# Patient Record
Sex: MALE | Race: WHITE | Employment: OTHER | URBAN - METROPOLITAN AREA
[De-identification: names, ages, dates, MRNs, and addresses within clinical notes are randomized per-mention and may not be internally consistent; named-entity substitution may affect disease eponyms.]

---

## 2018-06-05 PROBLEM — J85.1 ABSCESS OF UPPER LOBE OF RIGHT LUNG WITH PNEUMONIA (HCC): Status: ACTIVE | Noted: 2018-06-05

## 2018-06-05 PROBLEM — J18.9 PNEUMONIA: Status: ACTIVE | Noted: 2018-06-05

## 2018-06-11 DIAGNOSIS — J85.1 ABSCESS OF UPPER LOBE OF RIGHT LUNG WITH PNEUMONIA (HCC): Primary | ICD-10-CM

## 2018-06-15 ENCOUNTER — HOSPITAL ENCOUNTER (OUTPATIENT)
Dept: CT IMAGING | Age: 28
Discharge: HOME | End: 2018-06-15
Attending: INTERNAL MEDICINE | Admitting: INTERNAL MEDICINE

## 2018-06-15 DIAGNOSIS — J85.1 ABSCESS OF UPPER LOBE OF RIGHT LUNG WITH PNEUMONIA (HCC): ICD-10-CM

## 2018-06-18 LAB
ALBUMIN SERPL-MCNC: NORMAL G/DL
ALP BLD-CCNC: NORMAL U/L
ALT SERPL-CCNC: NORMAL U/L
ANION GAP SERPL CALCULATED.3IONS-SCNC: NORMAL MMOL/L
AST SERPL-CCNC: NORMAL U/L
BASOPHILS ABSOLUTE: 0 /ΜL
BASOPHILS RELATIVE PERCENT: 0.9 %
BILIRUB SERPL-MCNC: NORMAL MG/DL (ref 0.1–1.4)
BUN BLDV-MCNC: 18 MG/DL
CALCIUM SERPL-MCNC: 9.6 MG/DL
CHLORIDE BLD-SCNC: 103 MMOL/L
CO2: 29 MMOL/L
CREAT SERPL-MCNC: 0.77 MG/DL
EOSINOPHILS ABSOLUTE: 0.2 /ΜL
EOSINOPHILS RELATIVE PERCENT: 4.2 %
GFR CALCULATED: NORMAL
GLUCOSE BLD-MCNC: 93 MG/DL
HCT VFR BLD CALC: 43.1 % (ref 41–53)
HEMOGLOBIN: 14.1 G/DL (ref 13.5–17.5)
LYMPHOCYTES ABSOLUTE: 1.3 /ΜL
LYMPHOCYTES RELATIVE PERCENT: 25.2 %
MCH RBC QN AUTO: 29 PG
MCHC RBC AUTO-ENTMCNC: 33 G/DL
MCV RBC AUTO: 88 FL
MONOCYTES ABSOLUTE: 0.6 /ΜL
MONOCYTES RELATIVE PERCENT: 10.8 %
NEUTROPHILS ABSOLUTE: 3 /ΜL
NEUTROPHILS RELATIVE PERCENT: 57 %
PLATELET # BLD: 301 K/ΜL
PMV BLD AUTO: 10.3 FL
POTASSIUM SERPL-SCNC: 4.4 MMOL/L
RBC # BLD: 4.87 10^6/ΜL
SODIUM BLD-SCNC: 141 MMOL/L
TOTAL PROTEIN: NORMAL
WBC # BLD: 5.3 10^3/ML

## 2018-06-20 ENCOUNTER — OFFICE VISIT (OUTPATIENT)
Dept: INFECTIOUS DISEASES | Age: 28
End: 2018-06-20

## 2018-06-20 VITALS
HEIGHT: 74 IN | WEIGHT: 190 LBS | BODY MASS INDEX: 24.38 KG/M2 | DIASTOLIC BLOOD PRESSURE: 61 MMHG | SYSTOLIC BLOOD PRESSURE: 102 MMHG | TEMPERATURE: 98 F | HEART RATE: 75 BPM | OXYGEN SATURATION: 98 %

## 2018-06-20 DIAGNOSIS — J85.1 ABSCESS OF UPPER LOBE OF RIGHT LUNG WITH PNEUMONIA (HCC): Primary | ICD-10-CM

## 2018-06-20 DIAGNOSIS — R59.0 MEDIASTINAL ADENOPATHY: ICD-10-CM

## 2018-06-20 DIAGNOSIS — J18.9 PNEUMONIA DUE TO ORGANISM: ICD-10-CM

## 2018-06-20 DIAGNOSIS — R93.89 ABNORMAL CT OF THE CHEST: ICD-10-CM

## 2018-06-20 PROCEDURE — 99214 OFFICE O/P EST MOD 30 MIN: CPT | Performed by: INTERNAL MEDICINE

## 2018-07-15 NOTE — PROGRESS NOTES
06/18/2018     Lab Results   Component Value Date    CREATININE 0.77 06/18/2018    BUN 18 06/18/2018     06/18/2018    K 4.4 06/18/2018     06/18/2018    CO2 29.0 06/18/2018       Hepatic Function Panel:   Lab Results   Component Value Date    ALKPHOS 117 06/08/2018    ALT 52 06/08/2018    AST 22 06/08/2018    PROT 7.6 06/08/2018    BILITOT <0.2 06/08/2018    BILIDIR <0.2 06/08/2018    IBILI see below 06/08/2018    LABALBU 4.0 06/08/2018     UA:No results found for: Mark Poot, Randall Six, PROTEINU, UROBILINOGEN, NITRU, LEUKOCYTESUR, LABMICR, URINETYPE     No orders to display     FINDINGS:   Mediastinum: The unenhanced heart is unremarkable.  There are no enlarged   thoracic lymph nodes.  Residual thymus tissue is present anterior   mediastinum.  The right PICC terminates in the distal SVC.       Lungs/pleura: The tracheobronchial tree is patent. Marcha Sang is no pneumothorax   or pleural effusion.       There is improving with residual partially cavitating right upper lobe   airspace disease due to resolving pneumonia.  The left lung is clear.       There is no bronchial wall thickening, interstitial lung disease or air   trapping.       Upper Abdomen: Images of the upper abdomen are unremarkable.       Soft Tissues/Bones: The osseous structures are unremarkable.           Impression   1. Improving right upper lobe pneumonia.  Recommend chest radiograph in 6   weeks to confirm resolution.  .       Labs, Microbiology and  Radiology results pertinent to this visit and from care every where  reviewed in detail by me as part of the consultation     IMPRESSION:     Diagnosis Orders   1. Abscess of upper lobe of right lung with pneumonia ([address]     2. Mediastinal adenopathy     3. Abnormal CT of the chest     4.  Pneumonia due to organism       Rt UL PNA cavitary with good clinical response to IV Zosyn and LABS improved microbiological testing was negative suspected acute bacterial Process given the response to IV Zosyn. Follow up CT chest reviewed with pt       PLAN:    1. Finish home course of iv Zosyn   2. CT chest reviewed  3. Labs reviewed  4. Follow up as needed  5. Home care orders given for d/c picc and stop IV abx after completion of home therapy     D/w Pt and Family          Thanks for allowing me to participate in your patient's care and please do not hesitate to  call me with any questions or concerns.     Alonso Carlson MD  Infectious Disease  [facility]) Physician  Phone: [phone]   Fax : [phone]

## 2018-08-08 ENCOUNTER — OFFICE VISIT (OUTPATIENT)
Dept: PULMONOLOGY | Age: 28
End: 2018-08-08

## 2018-08-08 VITALS
WEIGHT: 186 LBS | TEMPERATURE: 97.6 F | RESPIRATION RATE: 16 BRPM | DIASTOLIC BLOOD PRESSURE: 60 MMHG | BODY MASS INDEX: 23.87 KG/M2 | OXYGEN SATURATION: 96 % | HEIGHT: 74 IN | HEART RATE: 90 BPM | SYSTOLIC BLOOD PRESSURE: 100 MMHG

## 2018-08-08 DIAGNOSIS — J18.9 PNEUMONIA OF LEFT LOWER LOBE DUE TO INFECTIOUS ORGANISM: Primary | ICD-10-CM

## 2018-08-08 DIAGNOSIS — R93.89 ABNORMAL CT OF THE CHEST: ICD-10-CM

## 2018-08-08 PROCEDURE — 99215 OFFICE O/P EST HI 40 MIN: CPT | Performed by: INTERNAL MEDICINE

## 2018-08-08 NOTE — PROGRESS NOTES
numbness or tingling, no seizures  SKIN:  No new rashes, no changes in hair or nails  LYMPH:  No masses, no swelling neck, armpits, or groin    PHYSICAL EXAM:  Vitals:    08/08/18 1406   BP: 100/60   Pulse: 90   Resp: 16   Temp: 97.6 °F (36.4 °C)   SpO2: 96%       GENERAL:  Well nourished, alert, appears stated age, no distress  HEENT:  No scleral icterus, no conjunctival irritation  NECK:  No thyromegaly, no bruits  LYMPH:  No cervical or supraclavicular adenopathy  HEART:  Regular rate and rhythm, no murmurs  LUNGS:  Left sided rhonchi with wheezing  ABDOMEN:  No distention, no organomegaly  EXTREMITIES:  No edema, no digital clubbing  NEURO:  No localizing deficits, CN II-XII intact    Pulmonary Function Testing  None    Chest imaging from 7/2018 is reviewed and compared to CT on 6/2018. My interpretation is resolving RUL infiltrate with new LLL patchy, nodular infiltrates. LLL nodule is present and unchanged     ECHO  None  Lab Results   Component Value Date    WBC 8.6 07/30/2018    HGB 14.4 07/30/2018    HCT 42.1 07/30/2018    MCV 85.0 07/30/2018     07/30/2018       No results found for: BNP    Lab Results   Component Value Date    CREATININE 0.8 (L) 07/30/2018    BUN 15 07/30/2018     07/30/2018    K 4.2 07/30/2018    CL 98 (L) 07/30/2018    CO2 26 07/30/2018         Assessment/Plan:  1. Pneumonia of left lower lobe due to infectious organism ([address]  Failed to respond to augmentin and azithromycin, but is at risk for HCAP organisms due to recent admission. We talked about collecting outpatient sputums including AFB but decided to do a bronchoscopy with BAL. I showed both he and his wife the two CT scans and compared and contrasted them. He is better from the first infection but has new findings. Will do BAL with cultures for typical and atypical organisms. Will send fungal and AFB cultures as well. Procedure discussed with them in detail    2.  Abnormal CT of the chest  Findings of

## 2018-08-09 ENCOUNTER — HOSPITAL ENCOUNTER (OUTPATIENT)
Dept: ENDOSCOPY | Age: 28
Discharge: HOME | End: 2018-08-09
Attending: INTERNAL MEDICINE | Admitting: INTERNAL MEDICINE

## 2018-08-09 VITALS
HEART RATE: 70 BPM | BODY MASS INDEX: 23.64 KG/M2 | SYSTOLIC BLOOD PRESSURE: 114 MMHG | TEMPERATURE: 97.4 F | RESPIRATION RATE: 16 BRPM | OXYGEN SATURATION: 98 % | WEIGHT: 184.08 LBS | DIASTOLIC BLOOD PRESSURE: 76 MMHG

## 2018-08-09 PROCEDURE — 99152 MOD SED SAME PHYS/QHP 5/>YRS: CPT | Performed by: INTERNAL MEDICINE

## 2018-08-09 PROCEDURE — 31624 DX BRONCHOSCOPE/LAVAGE: CPT | Performed by: INTERNAL MEDICINE

## 2018-08-09 RX ORDER — SODIUM CHLORIDE 9 MG/ML
INJECTION, SOLUTION INTRAVENOUS CONTINUOUS
Status: DISCONTINUED | OUTPATIENT
Start: 2018-08-09 | End: 2018-08-10 | Stop reason: HOSPADM

## 2018-08-09 RX ORDER — FENTANYL CITRATE 50 UG/ML
INJECTION, SOLUTION INTRAMUSCULAR; INTRAVENOUS
Status: COMPLETED | OUTPATIENT
Start: 2018-08-09 | End: 2018-08-09

## 2018-08-09 RX ORDER — LIDOCAINE HYDROCHLORIDE 20 MG/ML
INJECTION, SOLUTION EPIDURAL; INFILTRATION; INTRACAUDAL; PERINEURAL
Status: COMPLETED | OUTPATIENT
Start: 2018-08-09 | End: 2018-08-09

## 2018-08-09 RX ORDER — PREDNISONE 20 MG/1
20 TABLET ORAL DAILY
Qty: 7 TABLET | Refills: 0 | Status: SHIPPED | OUTPATIENT
Start: 2018-08-09 | End: 2018-08-16

## 2018-08-09 RX ORDER — MIDAZOLAM HYDROCHLORIDE 1 MG/ML
INJECTION INTRAMUSCULAR; INTRAVENOUS
Status: COMPLETED | OUTPATIENT
Start: 2018-08-09 | End: 2018-08-09

## 2018-08-09 RX ORDER — LIDOCAINE HYDROCHLORIDE 40 MG/ML
INJECTION, SOLUTION RETROBULBAR; TOPICAL
Status: COMPLETED | OUTPATIENT
Start: 2018-08-09 | End: 2018-08-09

## 2018-08-09 RX ADMIN — LIDOCAINE HYDROCHLORIDE 4 ML: 40 INJECTION, SOLUTION RETROBULBAR; TOPICAL at 12:35

## 2018-08-09 RX ADMIN — MIDAZOLAM HYDROCHLORIDE 2 MG: 1 INJECTION INTRAMUSCULAR; INTRAVENOUS at 12:38

## 2018-08-09 RX ADMIN — SODIUM CHLORIDE: 9 INJECTION, SOLUTION INTRAVENOUS at 11:57

## 2018-08-09 RX ADMIN — FENTANYL CITRATE 25 MCG: 50 INJECTION, SOLUTION INTRAMUSCULAR; INTRAVENOUS at 12:43

## 2018-08-09 RX ADMIN — MIDAZOLAM HYDROCHLORIDE 1 MG: 1 INJECTION INTRAMUSCULAR; INTRAVENOUS at 12:45

## 2018-08-09 RX ADMIN — LIDOCAINE HYDROCHLORIDE 7 ML: 20 INJECTION, SOLUTION EPIDURAL; INFILTRATION; INTRACAUDAL; PERINEURAL at 12:49

## 2018-08-09 RX ADMIN — FENTANYL CITRATE 25 MCG: 50 INJECTION, SOLUTION INTRAMUSCULAR; INTRAVENOUS at 12:38

## 2018-08-09 ASSESSMENT — PAIN - FUNCTIONAL ASSESSMENT: PAIN_FUNCTIONAL_ASSESSMENT: 0-10

## 2018-08-09 ASSESSMENT — PAIN DESCRIPTION - PAIN TYPE: TYPE: SURGICAL PAIN

## 2018-08-09 ASSESSMENT — PAIN SCALES - GENERAL
PAINLEVEL_OUTOF10: 0
PAINLEVEL_OUTOF10: 0

## 2018-08-09 NOTE — H&P
Pre-procedural H&P      HPI:      Here for elective bronchoscopy due to pneumonia. He was seen yesterday in office    Past Medical History:   Diagnosis Date    Pneumonia     Pneumonia 05/2018       History reviewed. No pertinent surgical history. No Known Allergies    No current outpatient prescriptions on file. Current Facility-Administered Medications   Medication Dose Route Frequency Provider Last Rate Last Dose    0.9 % sodium chloride infusion   Intravenous Continuous Claire Gum, DO 50 mL/hr at 08/09/18 1157         ROS:  GENERAL:  Fevers  HEENT:  No double vision, blurry vision, or difficulty swallowing  HEART:  No chest pain, no palpitations  LUNGS: Cough with discolored sputum, SOB, wheezing  ABDOMEN:  No abdominal pains, no changes in stools  GENITOURINARY:  No increased frequency, no blood in urine  EXTREMITIES:  No swelling, no lesions  NEURO:  No numbness or tingling, no seizures  SKIN:  No new rashes, no changes in hair or nails  LYMPH:  No masses, no swelling neck, armpits, or groin    GENERAL:  Well nourished, alert, appears stated age, no distress  HEENT:  No scleral icterus, no conjunctival irritation  NECK:  No thyromegaly, no bruits  LYMPH:  No cervical or supraclavicular adenopathy  HEART:  Regular rate and rhythm, no murmurs  LUNGS:  Crackles  ABDOMEN:  No distention, no organomegaly  EXTREMITIES:  No edema, no digital clubbing  NEURO:  No localizing deficits, CN II-XII intact    Assessment/Plan:  1. Pneumonia, LLL. Possible atypical organism.   ASA 2   -BAL, airway exam    Jessenia Sanchez DO  [facility]  [phone]

## 2018-08-11 LAB
CULTURE, RESPIRATORY: NORMAL
GRAM STAIN RESULT: NORMAL

## 2018-08-13 LAB
APPEARANCE BAL (LAVAGE): ABNORMAL
CLOT EVALUATION BAL: ABNORMAL
COLOR LAVAGE: ABNORMAL
EOSIN: 1 %
LYMPHOCYTES, BAL: 6 % (ref 5–10)
MACROPHAGES, BAL: 5 % (ref 90–95)
MONONUCLEAR UNIDENTIFIED CELLS FLUID BAL: 1 %
NUMBER OF CELLS COUNTED BAL (LAVAGE): 200
PATH REVIEW BAL (LAVAGE): NO
RBC, BAL: ABNORMAL /CUMM
SEGMENTED NEUTROPHILS, BAL: 87 % (ref 5–10)
WBC/EPI CELLS BAL: 1166 /CUMM

## 2018-08-17 DIAGNOSIS — J85.1 ABSCESS OF UPPER LOBE OF RIGHT LUNG WITH PNEUMONIA (HCC): Primary | ICD-10-CM

## 2018-08-17 RX ORDER — VORICONAZOLE 200 MG/1
200 TABLET, FILM COATED ORAL 2 TIMES DAILY
Qty: 60 TABLET | Refills: 3 | Status: SHIPPED | OUTPATIENT
Start: 2018-08-17 | End: 2018-09-16

## 2018-08-22 ENCOUNTER — OFFICE VISIT (OUTPATIENT)
Dept: INFECTIOUS DISEASES | Age: 28
End: 2018-08-22

## 2018-08-22 VITALS
HEIGHT: 74 IN | OXYGEN SATURATION: 98 % | DIASTOLIC BLOOD PRESSURE: 62 MMHG | HEART RATE: 77 BPM | SYSTOLIC BLOOD PRESSURE: 110 MMHG | WEIGHT: 184 LBS | BODY MASS INDEX: 23.61 KG/M2 | TEMPERATURE: 98.5 F

## 2018-08-22 DIAGNOSIS — J18.9 PNEUMONIA DUE TO ORGANISM: ICD-10-CM

## 2018-08-22 DIAGNOSIS — R93.89 ABNORMAL CT OF THE CHEST: ICD-10-CM

## 2018-08-22 DIAGNOSIS — B44.9 ASPERGILLUS PNEUMONIA (HCC): Primary | ICD-10-CM

## 2018-08-22 LAB
A/G RATIO: 1.7 (ref 1.1–2.2)
ALBUMIN SERPL-MCNC: 4.7 G/DL (ref 3.4–5)
ALP BLD-CCNC: 83 U/L (ref 40–129)
ALT SERPL-CCNC: 41 U/L (ref 10–40)
ANION GAP SERPL CALCULATED.3IONS-SCNC: 12 MMOL/L (ref 3–16)
AST SERPL-CCNC: 27 U/L (ref 15–37)
BILIRUB SERPL-MCNC: 0.5 MG/DL (ref 0–1)
BILIRUBIN DIRECT: <0.2 MG/DL (ref 0–0.3)
BILIRUBIN, INDIRECT: ABNORMAL MG/DL (ref 0–1)
BUN BLDV-MCNC: 21 MG/DL (ref 7–20)
CALCIUM SERPL-MCNC: 9.5 MG/DL (ref 8.3–10.6)
CHLORIDE BLD-SCNC: 103 MMOL/L (ref 99–110)
CO2: 25 MMOL/L (ref 21–32)
CREAT SERPL-MCNC: 0.7 MG/DL (ref 0.9–1.3)
GFR AFRICAN AMERICAN: >60
GFR NON-AFRICAN AMERICAN: >60
GLOBULIN: 2.8 G/DL
GLUCOSE BLD-MCNC: 93 MG/DL (ref 70–99)
IGA: 293 MG/DL (ref 70–400)
IGG: 1111 MG/DL (ref 700–1600)
IGM: 86 MG/DL (ref 40–230)
POTASSIUM SERPL-SCNC: 4.1 MMOL/L (ref 3.5–5.1)
SODIUM BLD-SCNC: 140 MMOL/L (ref 136–145)
TOTAL PROTEIN: 7.5 G/DL (ref 6.4–8.2)

## 2018-08-22 PROCEDURE — 99215 OFFICE O/P EST HI 40 MIN: CPT | Performed by: INTERNAL MEDICINE

## 2018-08-24 LAB — MISCELLANEOUS LAB TEST ORDER: NORMAL

## 2018-08-27 LAB — ASPERGILLUS ANTIBODY ID: NORMAL

## 2018-09-10 LAB
FUNGUS (MYCOLOGY) CULTURE: ABNORMAL
FUNGUS (MYCOLOGY) CULTURE: ABNORMAL
FUNGUS STAIN: ABNORMAL
ORGANISM: ABNORMAL

## 2018-09-10 NOTE — PROGRESS NOTES
this study. 4. Fatty liver. 5. Mild diverticulosis of the visualized colon. 6. Stable left costophrenic angle pulmonary nodule measuring 5 mm.  Follow-up   recommended according to Fleischner society guidelines provided below.       RECOMMENDATIONS:   Fleischner Society guidelines for follow-up and management of incidentally   detected pulmonary nodules:         Labs, Microbiology and  Radiology results pertinent to this visit and from care every where  reviewed in detail by me as part of the consultation     IMPRESSION:     Diagnosis Orders   1. Aspergillus pneumonia ([address]     2. Pneumonia due to organism  ASPERGILLUS GALACTOMANNAN AG ASSAY    MISCELLANEOUS SENDOUT 1    HEPATIC FUNCTION PANEL    IGG, IGA, IGM    Flow Cytometry T[place] CD4/CD8 Blood    ASPERGILLUS ANTIBODY BY IMMUNODIFFUSION    COMPREHENSIVE METABOLIC PANEL   3. 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 Check Aspergillus antibody  2. CD4 counts  3. HIV screen recently was negative   4. Immune globulin level  5. Aspergillus antigen , 1-3 Beta d GLUCAN   6. Start Voriconazole x oral (V fend) x 200 mg Q 12 HRS  7. Side effects d/w pt and his wife   8. Follow up x 8 weeks   9.d/w          Thanks for allowing me to participate in your patient's care and please do not hesitate to  call me with any questions or concerns.     Chuck Anthony MD  Infectious Disease  [facility]) Physician  Phone: [phone]   Fax : [phone]

## 2018-09-25 LAB
AFB CULTURE (MYCOBACTERIA): NORMAL
AFB SMEAR: NORMAL

## 2018-09-26 ENCOUNTER — HOSPITAL ENCOUNTER (OUTPATIENT)
Age: 28
Discharge: HOME | End: 2018-09-26
Payer: COMMERCIAL

## 2018-09-26 LAB
A/G RATIO: 1.5 (ref 1.1–2.2)
ALBUMIN SERPL-MCNC: 4.8 G/DL (ref 3.4–5)
ALP BLD-CCNC: 96 U/L (ref 40–129)
ALT SERPL-CCNC: 37 U/L (ref 10–40)
ANION GAP SERPL CALCULATED.3IONS-SCNC: 13 MMOL/L (ref 3–16)
AST SERPL-CCNC: 30 U/L (ref 15–37)
BILIRUB SERPL-MCNC: 0.3 MG/DL (ref 0–1)
BUN BLDV-MCNC: 26 MG/DL (ref 7–20)
CALCIUM SERPL-MCNC: 9.7 MG/DL (ref 8.3–10.6)
CHLORIDE BLD-SCNC: 104 MMOL/L (ref 99–110)
CO2: 24 MMOL/L (ref 21–32)
CREAT SERPL-MCNC: 0.8 MG/DL (ref 0.9–1.3)
GFR AFRICAN AMERICAN: >60
GFR NON-AFRICAN AMERICAN: >60
GLOBULIN: 3.1 G/DL
GLUCOSE BLD-MCNC: 88 MG/DL (ref 70–99)
POTASSIUM SERPL-SCNC: 3.8 MMOL/L (ref 3.5–5.1)
SODIUM BLD-SCNC: 141 MMOL/L (ref 136–145)
TOTAL PROTEIN: 7.9 G/DL (ref 6.4–8.2)

## 2018-09-26 PROCEDURE — 36415 COLL VENOUS BLD VENIPUNCTURE: CPT

## 2018-09-26 PROCEDURE — 80053 COMPREHEN METABOLIC PANEL: CPT

## 2018-10-03 ENCOUNTER — OFFICE VISIT (OUTPATIENT)
Dept: INFECTIOUS DISEASES | Age: 28
End: 2018-10-03
Payer: COMMERCIAL

## 2018-10-03 VITALS
TEMPERATURE: 97.9 F | OXYGEN SATURATION: 98 % | HEIGHT: 74 IN | HEART RATE: 72 BPM | WEIGHT: 184 LBS | SYSTOLIC BLOOD PRESSURE: 102 MMHG | DIASTOLIC BLOOD PRESSURE: 60 MMHG | BODY MASS INDEX: 23.61 KG/M2

## 2018-10-03 DIAGNOSIS — B44.9 ASPERGILLUS PNEUMONIA (HCC): ICD-10-CM

## 2018-10-03 DIAGNOSIS — R93.89 ABNORMAL CT OF THE CHEST: ICD-10-CM

## 2018-10-03 DIAGNOSIS — J18.9 PNEUMONIA DUE TO ORGANISM: Primary | ICD-10-CM

## 2018-10-03 PROCEDURE — 99213 OFFICE O/P EST LOW 20 MIN: CPT | Performed by: INTERNAL MEDICINE

## 2018-10-03 RX ORDER — VORICONAZOLE 200 MG/1
200 TABLET, FILM COATED ORAL 2 TIMES DAILY
COMMUNITY
End: 2019-04-22 | Stop reason: ALTCHOICE

## 2018-10-21 NOTE — PROGRESS NOTES
function. Denies joint swelling or pain. No myalgia, arthralgia. Denies focal weakness, sensory change or other neurologic symptoms  No lymph node swelling or tenderness. No symptoms endocrinopathy. No symptoms hematologic disease.     PHYSICAL EXAM:    Vitals:    /60 (Site: Left Upper Arm, Position: Sitting, Cuff Size: Large Adult)   Pulse 72   Temp 97.9 °F (36.6 °C) (Oral)   Ht 6' 2\" (1.88 m)   Wt 184 lb (83.5 kg)   SpO2 98%   BMI 23.62 kg/m²   General Appearance: alert,  in no acute distress, no pallor, no icterus   Skin: warm and dry, no rash or erythema  Head: normocephalic and atraumatic  Eyes: pupils equal, round, and reactive to light, conjunctivae normal  ENT: tympanic membrane, external ear and ear canal normal bilaterally, nose without deformity, nasal mucosa and turbinates normal without polyps  Neck: supple and non-tender without mass, no thyromegaly or thyroid nodules, no cervical lymphadenopathy  Pulmonary/Chest: clear to auscultation bilaterally- no wheezes, rales or rhonchi, normal air movement,  Cardiovascular: normal rate, regular rhythm, normal S1 and S2, no murmurs, rubs, clicks, or gallops,   Abdomen: soft, non-tender, non-distended, normal bowel sounds, no masses or organomegaly  Extremities: no cyanosis, clubbing or edema  Musculoskeletal: normal range of motion, no joint swelling, deformity or tenderness  Neurologic: reflexes normal and symmetric, no cranial nerve deficit,   Psych:  Orientation, sensorium, mood normal           DATA:    See EPIC  Lab Results   Component Value Date    WBC 8.6 07/30/2018    HGB 14.4 07/30/2018    HCT 42.1 07/30/2018    MCV 85.0 07/30/2018     07/30/2018     Lab Results   Component Value Date    CREATININE 0.8 (L) 09/26/2018    BUN 26 (H) 09/26/2018     09/26/2018    K 3.8 09/26/2018     09/26/2018    CO2 24 09/26/2018       Hepatic Function Panel:   Lab Results   Component Value Date    ALKPHOS 96 09/26/2018    ALT 37

## 2018-11-09 DIAGNOSIS — R93.89 ABNORMAL CT OF THE CHEST: ICD-10-CM

## 2018-11-09 DIAGNOSIS — J18.9 PNEUMONIA DUE TO ORGANISM: ICD-10-CM

## 2018-11-09 LAB
A/G RATIO: 2.1 (ref 1.1–2.2)
ALBUMIN SERPL-MCNC: 5 G/DL (ref 3.4–5)
ALP BLD-CCNC: 91 U/L (ref 40–129)
ALT SERPL-CCNC: 35 U/L (ref 10–40)
ANION GAP SERPL CALCULATED.3IONS-SCNC: 12 MMOL/L (ref 3–16)
AST SERPL-CCNC: 27 U/L (ref 15–37)
BILIRUB SERPL-MCNC: <0.2 MG/DL (ref 0–1)
BUN BLDV-MCNC: 19 MG/DL (ref 7–20)
CALCIUM SERPL-MCNC: 9.4 MG/DL (ref 8.3–10.6)
CHLORIDE BLD-SCNC: 104 MMOL/L (ref 99–110)
CO2: 25 MMOL/L (ref 21–32)
CREAT SERPL-MCNC: 0.8 MG/DL (ref 0.9–1.3)
GFR AFRICAN AMERICAN: >60
GFR NON-AFRICAN AMERICAN: >60
GLOBULIN: 2.4 G/DL
GLUCOSE BLD-MCNC: 100 MG/DL (ref 70–99)
POTASSIUM SERPL-SCNC: 4 MMOL/L (ref 3.5–5.1)
SODIUM BLD-SCNC: 141 MMOL/L (ref 136–145)
TOTAL PROTEIN: 7.4 G/DL (ref 6.4–8.2)

## 2018-11-16 ENCOUNTER — APPOINTMENT (OUTPATIENT)
Dept: CT IMAGING | Age: 28
End: 2018-11-16
Payer: COMMERCIAL

## 2018-11-16 ENCOUNTER — APPOINTMENT (OUTPATIENT)
Dept: GENERAL RADIOLOGY | Age: 28
End: 2018-11-16
Payer: COMMERCIAL

## 2018-11-16 ENCOUNTER — HOSPITAL ENCOUNTER (EMERGENCY)
Age: 28
Discharge: HOME | End: 2018-11-16
Attending: EMERGENCY MEDICINE
Payer: COMMERCIAL

## 2018-11-16 VITALS
HEIGHT: 74 IN | RESPIRATION RATE: 16 BRPM | BODY MASS INDEX: 23.48 KG/M2 | TEMPERATURE: 98 F | DIASTOLIC BLOOD PRESSURE: 69 MMHG | HEART RATE: 66 BPM | OXYGEN SATURATION: 98 % | WEIGHT: 182.98 LBS | SYSTOLIC BLOOD PRESSURE: 112 MMHG

## 2018-11-16 DIAGNOSIS — R06.02 SHORTNESS OF BREATH: ICD-10-CM

## 2018-11-16 DIAGNOSIS — R06.00 DYSPNEA, UNSPECIFIED TYPE: Primary | ICD-10-CM

## 2018-11-16 DIAGNOSIS — K40.90 LEFT INGUINAL HERNIA: ICD-10-CM

## 2018-11-16 DIAGNOSIS — B44.9 ASPERGILLUS PNEUMONIA (HCC): ICD-10-CM

## 2018-11-16 LAB
A/G RATIO: 1.6 (ref 1.1–2.2)
ALBUMIN SERPL-MCNC: 4.9 G/DL (ref 3.4–5)
ALP BLD-CCNC: 90 U/L (ref 40–129)
ALT SERPL-CCNC: 30 U/L (ref 10–40)
ANION GAP SERPL CALCULATED.3IONS-SCNC: 11 MMOL/L (ref 3–16)
AST SERPL-CCNC: 24 U/L (ref 15–37)
BASE EXCESS VENOUS: 2 MMOL/L (ref -3–3)
BASOPHILS ABSOLUTE: 0.1 K/UL (ref 0–0.2)
BASOPHILS RELATIVE PERCENT: 0.7 %
BILIRUB SERPL-MCNC: 0.3 MG/DL (ref 0–1)
BUN BLDV-MCNC: 19 MG/DL (ref 7–20)
CALCIUM SERPL-MCNC: 9.3 MG/DL (ref 8.3–10.6)
CHLORIDE BLD-SCNC: 101 MMOL/L (ref 99–110)
CO2: 26 MMOL/L (ref 21–32)
CREAT SERPL-MCNC: 0.7 MG/DL (ref 0.9–1.3)
EOSINOPHILS ABSOLUTE: 0.2 K/UL (ref 0–0.6)
EOSINOPHILS RELATIVE PERCENT: 2.6 %
GFR AFRICAN AMERICAN: >60
GFR NON-AFRICAN AMERICAN: >60
GLOBULIN: 3.1 G/DL
GLUCOSE BLD-MCNC: 100 MG/DL (ref 70–99)
HCO3 VENOUS: 27 MMOL/L (ref 23–29)
HCT VFR BLD CALC: 43.9 % (ref 40.5–52.5)
HEMOGLOBIN: 14.3 G/DL (ref 13.5–17.5)
LYMPHOCYTES ABSOLUTE: 2 K/UL (ref 1–5.1)
LYMPHOCYTES RELATIVE PERCENT: 27.1 %
MCH RBC QN AUTO: 28.1 PG (ref 26–34)
MCHC RBC AUTO-ENTMCNC: 32.6 G/DL (ref 31–36)
MCV RBC AUTO: 86.2 FL (ref 80–100)
MONOCYTES ABSOLUTE: 0.6 K/UL (ref 0–1.3)
MONOCYTES RELATIVE PERCENT: 9 %
NEUTROPHILS ABSOLUTE: 4.3 K/UL (ref 1.7–7.7)
NEUTROPHILS RELATIVE PERCENT: 60.6 %
O2 SAT, VEN: 77 %
O2 THERAPY: ABNORMAL
PCO2, VEN: 42.1 MMHG (ref 40–50)
PDW BLD-RTO: 11.9 % (ref 12.4–15.4)
PH VENOUS: 7.41 (ref 7.35–7.45)
PLATELET # BLD: 301 K/UL (ref 135–450)
PMV BLD AUTO: 7.9 FL (ref 5–10.5)
PO2, VEN: 41 MMHG (ref 25–40)
POTASSIUM REFLEX MAGNESIUM: 3.8 MMOL/L (ref 3.5–5.1)
RBC # BLD: 5.1 M/UL (ref 4.2–5.9)
SODIUM BLD-SCNC: 138 MMOL/L (ref 136–145)
TCO2 CALC VENOUS: 28 MMOL/L
TOTAL PROTEIN: 8 G/DL (ref 6.4–8.2)
WBC # BLD: 7.2 K/UL (ref 4–11)

## 2018-11-16 PROCEDURE — 85025 COMPLETE CBC W/AUTO DIFF WBC: CPT

## 2018-11-16 PROCEDURE — 99285 EMERGENCY DEPT VISIT HI MDM: CPT

## 2018-11-16 PROCEDURE — 71046 X-RAY EXAM CHEST 2 VIEWS: CPT

## 2018-11-16 PROCEDURE — 36415 COLL VENOUS BLD VENIPUNCTURE: CPT

## 2018-11-16 PROCEDURE — 82803 BLOOD GASES ANY COMBINATION: CPT

## 2018-11-16 PROCEDURE — 71260 CT THORAX DX C+: CPT

## 2018-11-16 PROCEDURE — 80053 COMPREHEN METABOLIC PANEL: CPT

## 2018-11-16 PROCEDURE — 6360000004 HC RX CONTRAST MEDICATION: Performed by: EMERGENCY MEDICINE

## 2018-11-16 RX ADMIN — IOPAMIDOL 100 ML: 755 INJECTION, SOLUTION INTRAVENOUS at 19:27

## 2018-11-16 ASSESSMENT — ENCOUNTER SYMPTOMS
SHORTNESS OF BREATH: 1
DIARRHEA: 0
SORE THROAT: 0
ABDOMINAL PAIN: 0
CHEST TIGHTNESS: 0
VOMITING: 0
NAUSEA: 0

## 2018-11-16 ASSESSMENT — PAIN SCALES - GENERAL
PAINLEVEL_OUTOF10: 0
PAINLEVEL_OUTOF10: 0

## 2018-11-16 NOTE — ED PROVIDER NOTES
[facility]  eMERGENCY dEPARTMENT eNCOUnter        Pt Name: Horacio Cisneros  MRN: [id number]  [place] 1990  Date of evaluation: 11/16/2018  Provider: Ilana Blair MD  PCP: Mayito Hernández       Chief Complaint   Patient presents with    Shortness of Breath     shortness of breath onset 2 days ago, having lung issues since May       HISTORY OFPRESENT ILLNESS   (Location/Symptom, Timing/Onset, Context/Setting, Quality, Duration, Modifying Factors,Severity)  Note limiting factors. Horacio Cisneros is a 33 y.o. male  with history of Aspergillus pneumonia that was diagnosed 2 months ago presents with complaint of worsening shortness of breath. Patient states that he was started on a voriconazole and is being followed by infectious disease, Dr. Carolyn Bradshaw. Patient states his symptoms began a few days ago which are worsening shortness of breath with exertion and some shortness of breath at rest.  Denies any cough or sputum production. Denies any chest pain. Denies any fevers states he does have night sweats. Patient states he has been taking the voriconazole as prescribed. Patient also complains of some left-sided testicle pain which has been present for the past several years intermittently. The pain is an aching sensation gets worse with standing for long period time. Denies any trauma to the testicles. Denies any penile discharge or pain. Denies any difficulty urinating. Denies any open sores or lesions to the genitals. Nursing Notes were all reviewed and agreed with or any disagreements were addressed  in the HPI. REVIEW OF SYSTEMS    (2-9 systems for level 4, 10 or more for level 5)     Review of Systems   Constitutional: Positive for diaphoresis (night sweats). Negative for chills and fever. HENT: Negative for congestion and sore throat. Respiratory: Positive for shortness of breath. Negative for chest tightness.     Cardiovascular: Negative for chest pain. Gastrointestinal: Negative for abdominal pain, diarrhea, nausea and vomiting. Genitourinary: Positive for testicular pain. Negative for decreased urine volume, discharge, dysuria, frequency, penile pain and urgency. Musculoskeletal: Negative for arthralgias and neck pain. Skin: Negative for rash and wound. Neurological: Negative for weakness and numbness. Positives and Pertinent negatives as per HPI. Except as noted above in the ROS, all other systems were reviewed andnegative. PASTMEDICAL HISTORY     Past Medical History:   Diagnosis Date    Pneumonia     Aspergillius fungus    Pneumonia 05/2018         SURGICAL HISTORY     History reviewed. No pertinent surgical history. CURRENT MEDICATIONS       Previous Medications    VORICONAZOLE (VFEND) 200 MG TABLET    Take 200 mg by mouth 2 times daily       ALLERGIES     Patient has no known allergies. FAMILY HISTORY     History reviewed. No pertinent family history. SOCIAL HISTORY       Social History     Social History    Marital status:      Spouse name: N/A    Number of children: N/A    Years of education: N/A     Social History Main Topics    Smoking status: Never Smoker    Smokeless tobacco: Never Used    Alcohol use Yes      Comment: rarely    Drug use: No    Sexual activity: Yes     Partners: Female     Other Topics Concern    None     Social History Narrative    None       SCREENINGS             PHYSICAL EXAM    (up to 7 for level 4, 8 or more for level 5)     ED Triage Vitals [11/16/18 1802]   BP Temp Temp Source Pulse Resp SpO2 Height Weight   129/76 98 °F (36.7 °C) Oral 79 16 98 % 6' 2\" (1.88 m) 182 lb 15.7 oz (83 kg)       Physical Exam   Constitutional: He is oriented to person, place, and time. He appears well-developed and well-nourished. No distress. HENT:   Head: Normocephalic and atraumatic.    Mouth/Throat: Oropharynx is clear and moist.   No stridor   Eyes: Pupils are equal, round, and reactive to light. Conjunctivae and EOM are normal.   Neck: Normal range of motion. Neck supple. Cardiovascular: Normal rate, regular rhythm, normal heart sounds and intact distal pulses. Pulmonary/Chest: Effort normal and breath sounds normal. No respiratory distress. He has no wheezes. He has no rales. Speaking in full sentences, no respiratory distress   Abdominal: Soft. Bowel sounds are normal. He exhibits no distension. There is no tenderness. There is no rebound. A hernia is present. Hernia confirmed positive in the left inguinal area. Hernia confirmed negative in the right inguinal area. Genitourinary: Penis normal. Right testis shows no swelling and no tenderness. Left testis shows no swelling and no tenderness. Circumcised. No penile tenderness. Musculoskeletal: Normal range of motion. He exhibits no tenderness. Lymphadenopathy: No inguinal adenopathy noted on the right or left side. Neurological: He is alert and oriented to person, place, and time. Skin: Skin is warm and dry. Capillary refill takes less than 2 seconds. He is not diaphoretic. No erythema. Nursing note and vitals reviewed.           DIAGNOSTIC RESULTS   LABS:    Labs Reviewed   CBC WITH AUTO DIFFERENTIAL - Abnormal; Notable for the following:        Result Value    RDW 11.9 (*)     All other components within normal limits    Narrative:     Performed at:  [facility]  [address]   Phone [phone]   COMPREHENSIVE METABOLIC PANEL W/ REFLEX TO MG FOR LOW K - Abnormal; Notable for the following:     Glucose 100 (*)     CREATININE 0.7 (*)     All other components within normal limits    Narrative:     Performed at:  [facility]  [address]   Phone [phone]   BLOOD GAS, VENOUS - Abnormal; Notable for the following:     pO2, Maximo 41.0 (*)     All other components

## 2018-12-17 DIAGNOSIS — J18.9 PNEUMONIA DUE TO ORGANISM: ICD-10-CM

## 2018-12-17 DIAGNOSIS — R93.89 ABNORMAL CT OF THE CHEST: ICD-10-CM

## 2018-12-18 LAB
A/G RATIO: 1.8 (ref 1.1–2.2)
ALBUMIN SERPL-MCNC: 5 G/DL (ref 3.4–5)
ALP BLD-CCNC: 85 U/L (ref 40–129)
ALT SERPL-CCNC: 31 U/L (ref 10–40)
ANION GAP SERPL CALCULATED.3IONS-SCNC: 17 MMOL/L (ref 3–16)
AST SERPL-CCNC: 28 U/L (ref 15–37)
BILIRUB SERPL-MCNC: 0.3 MG/DL (ref 0–1)
BUN BLDV-MCNC: 18 MG/DL (ref 7–20)
CALCIUM SERPL-MCNC: 9.6 MG/DL (ref 8.3–10.6)
CHLORIDE BLD-SCNC: 102 MMOL/L (ref 99–110)
CO2: 25 MMOL/L (ref 21–32)
CREAT SERPL-MCNC: 0.9 MG/DL (ref 0.9–1.3)
GFR AFRICAN AMERICAN: >60
GFR NON-AFRICAN AMERICAN: >60
GLOBULIN: 2.8 G/DL
GLUCOSE BLD-MCNC: 82 MG/DL (ref 70–99)
POTASSIUM SERPL-SCNC: 4.2 MMOL/L (ref 3.5–5.1)
SODIUM BLD-SCNC: 144 MMOL/L (ref 136–145)
TOTAL PROTEIN: 7.8 G/DL (ref 6.4–8.2)

## 2018-12-19 ENCOUNTER — OFFICE VISIT (OUTPATIENT)
Dept: INFECTIOUS DISEASES | Age: 28
End: 2018-12-19
Payer: COMMERCIAL

## 2018-12-19 VITALS
HEART RATE: 82 BPM | OXYGEN SATURATION: 98 % | DIASTOLIC BLOOD PRESSURE: 60 MMHG | SYSTOLIC BLOOD PRESSURE: 102 MMHG | BODY MASS INDEX: 23.61 KG/M2 | HEIGHT: 74 IN | TEMPERATURE: 97.8 F | WEIGHT: 184 LBS

## 2018-12-19 DIAGNOSIS — R93.89 ABNORMAL CT OF THE CHEST: ICD-10-CM

## 2018-12-19 DIAGNOSIS — B44.9 ASPERGILLUS PNEUMONIA (HCC): Primary | ICD-10-CM

## 2018-12-19 DIAGNOSIS — J18.9 PNEUMONIA DUE TO ORGANISM: ICD-10-CM

## 2018-12-19 PROCEDURE — 99213 OFFICE O/P EST LOW 20 MIN: CPT | Performed by: INTERNAL MEDICINE

## 2018-12-19 RX ORDER — TAMSULOSIN HYDROCHLORIDE 0.4 MG/1
0.4 CAPSULE ORAL DAILY
COMMUNITY
End: 2020-03-11

## 2018-12-19 NOTE — PROGRESS NOTES
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 He recently had CT chest done on Nov 2018 for chest pains and tree in bud changes at Left base have improved and no new lesions. Past Medical History:    Past Medical History:   Diagnosis Date    Pneumonia     Aspergillius fungus    Pneumonia 05/2018       Past Surgical History:    No past surgical history on file. Current Medications:    Current Outpatient Prescriptions   Medication Sig Dispense Refill    tamsulosin (FLOMAX) 0.4 MG capsule Take 0.4 mg by mouth daily      voriconazole (VFEND) 200 MG tablet Take 200 mg by mouth 2 times daily       No current facility-administered medications for this visit. Allergies:  Patient has no known allergies. Immunizations :   Immunization History   Administered Date(s) Administered    PPD Test 06/05/2018           Social History:     Social History     Social History    Marital status:      Spouse name: N/A    Number of children: N/A    Years of education: N/A     Social History Main Topics    Smoking status: Never Smoker    Smokeless tobacco: Never Used    Alcohol use Yes      Comment: rarely    Drug use: No    Sexual activity: Yes     Partners: Female     Other Topics Concern    None     Social History Narrative    None       Family History : no DM no DVT       REVIEW OF SYSTEMS:    No fevers, chills, sweats. No weight loss. No visual change, eye pain, eye discharge.     No oral

## 2019-04-22 ENCOUNTER — APPOINTMENT (OUTPATIENT)
Dept: GENERAL RADIOLOGY | Age: 29
End: 2019-04-22
Payer: COMMERCIAL

## 2019-04-22 ENCOUNTER — APPOINTMENT (OUTPATIENT)
Dept: CT IMAGING | Age: 29
End: 2019-04-22
Payer: COMMERCIAL

## 2019-04-22 ENCOUNTER — HOSPITAL ENCOUNTER (EMERGENCY)
Age: 29
Discharge: HOME | End: 2019-04-22
Payer: COMMERCIAL

## 2019-04-22 VITALS
BODY MASS INDEX: 23.62 KG/M2 | HEART RATE: 68 BPM | DIASTOLIC BLOOD PRESSURE: 67 MMHG | RESPIRATION RATE: 14 BRPM | SYSTOLIC BLOOD PRESSURE: 120 MMHG | OXYGEN SATURATION: 100 % | HEIGHT: 74 IN | TEMPERATURE: 97.2 F

## 2019-04-22 DIAGNOSIS — R04.2 COUGHING BLOOD: ICD-10-CM

## 2019-04-22 DIAGNOSIS — J06.9 URI WITH COUGH AND CONGESTION: Primary | ICD-10-CM

## 2019-04-22 LAB
A/G RATIO: 1.1 (ref 1.1–2.2)
ALBUMIN SERPL-MCNC: 4.2 G/DL (ref 3.4–5)
ALP BLD-CCNC: 87 U/L (ref 40–129)
ALT SERPL-CCNC: 23 U/L (ref 10–40)
ANION GAP SERPL CALCULATED.3IONS-SCNC: 11 MMOL/L (ref 3–16)
AST SERPL-CCNC: 19 U/L (ref 15–37)
BASOPHILS ABSOLUTE: 0.1 K/UL (ref 0–0.2)
BASOPHILS RELATIVE PERCENT: 0.6 %
BILIRUB SERPL-MCNC: 0.5 MG/DL (ref 0–1)
BUN BLDV-MCNC: 20 MG/DL (ref 7–20)
CALCIUM SERPL-MCNC: 9.3 MG/DL (ref 8.3–10.6)
CHLORIDE BLD-SCNC: 104 MMOL/L (ref 99–110)
CO2: 26 MMOL/L (ref 21–32)
CREAT SERPL-MCNC: 0.7 MG/DL (ref 0.9–1.3)
EOSINOPHILS ABSOLUTE: 0.2 K/UL (ref 0–0.6)
EOSINOPHILS RELATIVE PERCENT: 1.9 %
GFR AFRICAN AMERICAN: >60
GFR NON-AFRICAN AMERICAN: >60
GLOBULIN: 3.8 G/DL
GLUCOSE BLD-MCNC: 96 MG/DL (ref 70–99)
HCT VFR BLD CALC: 40.9 % (ref 40.5–52.5)
HEMOGLOBIN: 14.1 G/DL (ref 13.5–17.5)
LACTIC ACID: 0.5 MMOL/L (ref 0.4–2)
LYMPHOCYTES ABSOLUTE: 2.1 K/UL (ref 1–5.1)
LYMPHOCYTES RELATIVE PERCENT: 24.6 %
MCH RBC QN AUTO: 29.6 PG (ref 26–34)
MCHC RBC AUTO-ENTMCNC: 34.5 G/DL (ref 31–36)
MCV RBC AUTO: 85.8 FL (ref 80–100)
MONOCYTES ABSOLUTE: 0.9 K/UL (ref 0–1.3)
MONOCYTES RELATIVE PERCENT: 10.2 %
NEUTROPHILS ABSOLUTE: 5.4 K/UL (ref 1.7–7.7)
NEUTROPHILS RELATIVE PERCENT: 62.7 %
PDW BLD-RTO: 13 % (ref 12.4–15.4)
PLATELET # BLD: 251 K/UL (ref 135–450)
PMV BLD AUTO: 8 FL (ref 5–10.5)
POTASSIUM SERPL-SCNC: 4 MMOL/L (ref 3.5–5.1)
RBC # BLD: 4.76 M/UL (ref 4.2–5.9)
SODIUM BLD-SCNC: 141 MMOL/L (ref 136–145)
TOTAL PROTEIN: 8 G/DL (ref 6.4–8.2)
TROPONIN: <0.01 NG/ML
WBC # BLD: 8.6 K/UL (ref 4–11)

## 2019-04-22 PROCEDURE — 84484 ASSAY OF TROPONIN QUANT: CPT

## 2019-04-22 PROCEDURE — 71046 X-RAY EXAM CHEST 2 VIEWS: CPT

## 2019-04-22 PROCEDURE — 87040 BLOOD CULTURE FOR BACTERIA: CPT

## 2019-04-22 PROCEDURE — 71260 CT THORAX DX C+: CPT

## 2019-04-22 PROCEDURE — 83605 ASSAY OF LACTIC ACID: CPT

## 2019-04-22 PROCEDURE — 80053 COMPREHEN METABOLIC PANEL: CPT

## 2019-04-22 PROCEDURE — 85025 COMPLETE CBC W/AUTO DIFF WBC: CPT

## 2019-04-22 PROCEDURE — 99284 EMERGENCY DEPT VISIT MOD MDM: CPT

## 2019-04-22 PROCEDURE — 2580000003 HC RX 258: Performed by: NURSE PRACTITIONER

## 2019-04-22 PROCEDURE — 6360000004 HC RX CONTRAST MEDICATION: Performed by: NURSE PRACTITIONER

## 2019-04-22 RX ORDER — 0.9 % SODIUM CHLORIDE 0.9 %
1000 INTRAVENOUS SOLUTION INTRAVENOUS ONCE
Status: COMPLETED | OUTPATIENT
Start: 2019-04-22 | End: 2019-04-22

## 2019-04-22 RX ADMIN — IOPAMIDOL 75 ML: 755 INJECTION, SOLUTION INTRAVENOUS at 19:52

## 2019-04-22 RX ADMIN — SODIUM CHLORIDE 1000 ML: 9 INJECTION, SOLUTION INTRAVENOUS at 20:55

## 2019-04-22 ASSESSMENT — ENCOUNTER SYMPTOMS
SINUS PAIN: 0
FACIAL SWELLING: 0
VOMITING: 0
TROUBLE SWALLOWING: 0
COUGH: 1
VOICE CHANGE: 0
NAUSEA: 0
RHINORRHEA: 1
ABDOMINAL DISTENTION: 0
BACK PAIN: 0
ABDOMINAL PAIN: 0
EYE PAIN: 0
SORE THROAT: 1
SHORTNESS OF BREATH: 1
EYE DISCHARGE: 0

## 2019-04-22 NOTE — ED PROVIDER NOTES
Cardiovascular: Negative for chest pain and leg swelling. Gastrointestinal: Negative for abdominal distention, abdominal pain, nausea and vomiting. Musculoskeletal: Negative for back pain, neck pain and neck stiffness. Skin: Negative for pallor and rash. Allergic/Immunologic: Negative for immunocompromised state. Neurological: Negative for dizziness, syncope, weakness and headaches. Hematological: Negative for adenopathy. Psychiatric/Behavioral: Negative for confusion. All other systems reviewed and are negative. Positives and Pertinent negatives as per HPI. Except as noted above in the ROS, problem specific ROS was completed and is negative. Physical Exam:  Physical Exam   Constitutional: He is oriented to person, place, and time. Vital signs are normal. He appears well-developed and well-nourished. Non-toxic appearance. No distress. HENT:   Head: Normocephalic and atraumatic. Right Ear: Tympanic membrane and ear canal normal.   Left Ear: Tympanic membrane and ear canal normal.   Nose: Nose normal.   Mouth/Throat: Uvula is midline, oropharynx is clear and moist and mucous membranes are normal.   Eyes: Pupils are equal, round, and reactive to light. EOM and lids are normal. No scleral icterus. Very mild injection bilaterally   Neck: Full passive range of motion without pain. Neck supple. No JVD present. No neck rigidity. Cardiovascular: Normal rate and regular rhythm. Exam reveals no gallop and no friction rub. No murmur heard. Pulmonary/Chest: Effort normal and breath sounds normal. No respiratory distress. Abdominal: Soft. Normal appearance. He exhibits no distension. There is no tenderness. There is no rigidity. Musculoskeletal: Normal range of motion. Neurological: He is alert and oriented to person, place, and time. No cranial nerve deficit. Skin: Skin is warm, dry and intact. Capillary refill takes less than 2 seconds. No rash noted.    Psychiatric: He has a normal mood and affect. Nursing note and vitals reviewed. MEDICAL DECISION MAKING    Vitals:    Vitals:    04/22/19 1803   BP: 118/71   Pulse: 78   Resp: 18   Temp: 97.2 °F (36.2 °C)   TempSrc: Oral   SpO2: 98%   Height: 6' 2\" (1.88 m)       LABS:  Labs Reviewed   COMPREHENSIVE METABOLIC PANEL - Abnormal; Notable for the following components:       Result Value    CREATININE 0.7 (*)     All other components within normal limits    Narrative:     Performed at:  [facility]  [address]   Phone [phone]   CULTURE BLOOD #1   CULTURE BLOOD #2   LACTIC ACID, PLASMA    Narrative:     Performed at:  [address] [address]   Phone [phone]   CBC WITH AUTO DIFFERENTIAL    Narrative:     Performed at:  [facility]  [address] [place], [place] [address]   Phone [phone]   TROPONIN    Narrative:     Performed at:  [facility]  [address] [place] [address]   Phone [phone] of labs reviewed and werenegative at this time or not returned at the time of this note. RADIOLOGY:   Non-plain film images such as CT, Ultrasound and MRI are read by the radiologist. MELI Longoria CNP have directly visualized the radiologic plain film image(s) with the below findings:        Interpretation per the Radiologist below, if available at the time of thisnote:    CT CHEST PULMONARY EMBOLISM W CONTRAST   Final Result   No evidence of pulmonary embolism or acute pulmonary abnormality. XR CHEST STANDARD (2 VW)   Final Result   No acute process. No results found.      MEDICAL DECISION MAKING / ED COURSE:      PROCEDURES:   Procedures    None    Patient was given:     Medications   0.9 % sodium chloride bolus (0 mLs Intravenous Stopped 4/22/19 2125)   iopamidol

## 2019-04-23 NOTE — ED NOTES
Patient presents to the ED complaining of cough/congestion/shortness of breath over the past few days. Denies vomiting/nausea/fevers/chest pain. Patient awake, alert, and oriented, respirations even and easy. Updated on plan of care.      Suzanne Myers RN  04/22/19 6246

## 2019-04-23 NOTE — ED NOTES
Assuming care of patient, patient to room 45 then transported to CT scan via stretcher.        Natalie Oates RN  04/22/19 8701

## 2019-04-26 ENCOUNTER — HOSPITAL ENCOUNTER (EMERGENCY)
Age: 29
Discharge: HOME | End: 2019-04-26
Attending: EMERGENCY MEDICINE
Payer: COMMERCIAL

## 2019-04-26 ENCOUNTER — APPOINTMENT (OUTPATIENT)
Dept: GENERAL RADIOLOGY | Age: 29
End: 2019-04-26
Payer: COMMERCIAL

## 2019-04-26 ENCOUNTER — APPOINTMENT (OUTPATIENT)
Dept: ULTRASOUND IMAGING | Age: 29
End: 2019-04-26
Payer: COMMERCIAL

## 2019-04-26 VITALS
RESPIRATION RATE: 11 BRPM | WEIGHT: 172.18 LBS | TEMPERATURE: 97.8 F | OXYGEN SATURATION: 100 % | SYSTOLIC BLOOD PRESSURE: 109 MMHG | HEART RATE: 60 BPM | HEIGHT: 74 IN | BODY MASS INDEX: 22.1 KG/M2 | DIASTOLIC BLOOD PRESSURE: 61 MMHG

## 2019-04-26 DIAGNOSIS — N50.819 TESTICULAR PAIN: ICD-10-CM

## 2019-04-26 DIAGNOSIS — R07.81 PLEURITIC CHEST PAIN: Primary | ICD-10-CM

## 2019-04-26 LAB
ANION GAP SERPL CALCULATED.3IONS-SCNC: 11 MMOL/L (ref 3–16)
BASOPHILS ABSOLUTE: 0 K/UL (ref 0–0.2)
BASOPHILS RELATIVE PERCENT: 0.6 %
BILIRUBIN URINE: NEGATIVE
BLOOD, URINE: NEGATIVE
BUN BLDV-MCNC: 14 MG/DL (ref 7–20)
CALCIUM SERPL-MCNC: 9.7 MG/DL (ref 8.3–10.6)
CHLORIDE BLD-SCNC: 101 MMOL/L (ref 99–110)
CLARITY: CLEAR
CO2: 23 MMOL/L (ref 21–32)
COLOR: YELLOW
CREAT SERPL-MCNC: 0.7 MG/DL (ref 0.9–1.3)
EKG ATRIAL RATE: 70 BPM
EKG DIAGNOSIS: NORMAL
EKG P AXIS: 13 DEGREES
EKG P-R INTERVAL: 158 MS
EKG Q-T INTERVAL: 370 MS
EKG QRS DURATION: 90 MS
EKG QTC CALCULATION (BAZETT): 399 MS
EKG R AXIS: 41 DEGREES
EKG T AXIS: 42 DEGREES
EKG VENTRICULAR RATE: 70 BPM
EOSINOPHILS ABSOLUTE: 0.1 K/UL (ref 0–0.6)
EOSINOPHILS RELATIVE PERCENT: 2.4 %
GFR AFRICAN AMERICAN: >60
GFR NON-AFRICAN AMERICAN: >60
GLUCOSE BLD-MCNC: 85 MG/DL (ref 70–99)
GLUCOSE URINE: NEGATIVE MG/DL
HCT VFR BLD CALC: 45.9 % (ref 40.5–52.5)
HEMOGLOBIN: 15.6 G/DL (ref 13.5–17.5)
KETONES, URINE: NEGATIVE MG/DL
LEUKOCYTE ESTERASE, URINE: NEGATIVE
LYMPHOCYTES ABSOLUTE: 1.7 K/UL (ref 1–5.1)
LYMPHOCYTES RELATIVE PERCENT: 27.8 %
MCH RBC QN AUTO: 29.4 PG (ref 26–34)
MCHC RBC AUTO-ENTMCNC: 34 G/DL (ref 31–36)
MCV RBC AUTO: 86.5 FL (ref 80–100)
MICROSCOPIC EXAMINATION: NORMAL
MONOCYTES ABSOLUTE: 0.5 K/UL (ref 0–1.3)
MONOCYTES RELATIVE PERCENT: 8.3 %
NEUTROPHILS ABSOLUTE: 3.8 K/UL (ref 1.7–7.7)
NEUTROPHILS RELATIVE PERCENT: 60.9 %
NITRITE, URINE: NEGATIVE
PDW BLD-RTO: 12.7 % (ref 12.4–15.4)
PH UA: 7.5 (ref 5–8)
PLATELET # BLD: 295 K/UL (ref 135–450)
PMV BLD AUTO: 7.8 FL (ref 5–10.5)
POTASSIUM SERPL-SCNC: 4.3 MMOL/L (ref 3.5–5.1)
PROTEIN UA: NEGATIVE MG/DL
RBC # BLD: 5.31 M/UL (ref 4.2–5.9)
SODIUM BLD-SCNC: 135 MMOL/L (ref 136–145)
SPECIFIC GRAVITY UA: 1.01 (ref 1–1.03)
TROPONIN: <0.01 NG/ML
URINE REFLEX TO CULTURE: NORMAL
URINE TYPE: NORMAL
UROBILINOGEN, URINE: 0.2 E.U./DL
WBC # BLD: 6.2 K/UL (ref 4–11)

## 2019-04-26 PROCEDURE — 99284 EMERGENCY DEPT VISIT MOD MDM: CPT

## 2019-04-26 PROCEDURE — 80048 BASIC METABOLIC PNL TOTAL CA: CPT

## 2019-04-26 PROCEDURE — 71046 X-RAY EXAM CHEST 2 VIEWS: CPT

## 2019-04-26 PROCEDURE — 93010 ELECTROCARDIOGRAM REPORT: CPT | Performed by: INTERNAL MEDICINE

## 2019-04-26 PROCEDURE — 81003 URINALYSIS AUTO W/O SCOPE: CPT

## 2019-04-26 PROCEDURE — 85025 COMPLETE CBC W/AUTO DIFF WBC: CPT

## 2019-04-26 PROCEDURE — 84484 ASSAY OF TROPONIN QUANT: CPT

## 2019-04-26 PROCEDURE — 6360000002 HC RX W HCPCS: Performed by: PHYSICIAN ASSISTANT

## 2019-04-26 PROCEDURE — 96374 THER/PROPH/DIAG INJ IV PUSH: CPT

## 2019-04-26 PROCEDURE — 76870 US EXAM SCROTUM: CPT

## 2019-04-26 PROCEDURE — 93005 ELECTROCARDIOGRAM TRACING: CPT | Performed by: PHYSICIAN ASSISTANT

## 2019-04-26 RX ORDER — MELOXICAM 15 MG/1
15 TABLET ORAL DAILY
Qty: 10 TABLET | Refills: 0 | Status: SHIPPED | OUTPATIENT
Start: 2019-04-26 | End: 2020-01-25

## 2019-04-26 RX ORDER — KETOROLAC TROMETHAMINE 30 MG/ML
30 INJECTION, SOLUTION INTRAMUSCULAR; INTRAVENOUS ONCE
Status: COMPLETED | OUTPATIENT
Start: 2019-04-26 | End: 2019-04-26

## 2019-04-26 RX ADMIN — KETOROLAC TROMETHAMINE 30 MG: 30 INJECTION, SOLUTION INTRAMUSCULAR; INTRAVENOUS at 10:55

## 2019-04-26 ASSESSMENT — PAIN DESCRIPTION - ONSET
ONSET_2: AWAKENED FROM SLEEP
ONSET: ON-GOING

## 2019-04-26 ASSESSMENT — PAIN DESCRIPTION - INTENSITY
RATING_2: 6
RATING_2: 2
RATING_2: 5

## 2019-04-26 ASSESSMENT — PAIN DESCRIPTION - FREQUENCY: FREQUENCY: INTERMITTENT

## 2019-04-26 ASSESSMENT — PAIN DESCRIPTION - DESCRIPTORS
DESCRIPTORS_2: THROBBING
DESCRIPTORS: ACHING;SHARP

## 2019-04-26 ASSESSMENT — ENCOUNTER SYMPTOMS
CHOKING: 0
VOMITING: 0
FACIAL SWELLING: 0
NAUSEA: 0
ABDOMINAL PAIN: 0
APNEA: 0
EYE DISCHARGE: 0
BACK PAIN: 0
EYE REDNESS: 0
SHORTNESS OF BREATH: 0

## 2019-04-26 ASSESSMENT — PAIN SCALES - GENERAL
PAINLEVEL_OUTOF10: 2
PAINLEVEL_OUTOF10: 4

## 2019-04-26 ASSESSMENT — PAIN DESCRIPTION - PROGRESSION
CLINICAL_PROGRESSION: NOT CHANGED
CLINICAL_PROGRESSION_2: NOT CHANGED

## 2019-04-26 ASSESSMENT — PAIN DESCRIPTION - LOCATION
LOCATION: CHEST
LOCATION_2: OTHER (COMMENT)

## 2019-04-26 ASSESSMENT — PAIN - FUNCTIONAL ASSESSMENT: PAIN_FUNCTIONAL_ASSESSMENT: ACTIVITIES ARE NOT PREVENTED

## 2019-04-26 ASSESSMENT — PAIN DESCRIPTION - PAIN TYPE: TYPE: ACUTE PAIN

## 2019-04-26 ASSESSMENT — PAIN DESCRIPTION - ORIENTATION: ORIENTATION: UPPER;LEFT

## 2019-04-26 ASSESSMENT — PAIN DESCRIPTION - DURATION: DURATION_2: INTERMITTENT

## 2019-04-26 NOTE — ED NOTES
Pt ambulated to and from bathroom without difficulty for urine specimen     Niki Bradshaw RN  04/26/19 2285

## 2019-04-26 NOTE — ED TRIAGE NOTES
pt presents to ED via private vehicle for cp since saturday that is not getting any better. pain 4/10 in upper left that is achy and sharp at times. pain better with rest. seen in ER on Monday with hemoptypsis that was due to irriation. recent lung abscess last year. Denies cough since Monday. Reports SOB with exertion. Pt AAOx4, VSS. C/o testicle pain x years but worsening in last few weeks. US last year that was negative, pt stated he felt lumps. pain 6/10.

## 2019-04-26 NOTE — ED NOTES
Pt to and from  [address] without incident      Ta Montanez, GRACE  04/26/19 1122

## 2019-04-26 NOTE — ED PROVIDER NOTES
**EVALUATED BY ADVANCED PRACTICE PROVIDER**        [facility]  eMERGENCY dEPARTMENT eNCOUnter      Pt Name: Shedrick Burkitt  FKP:[id number]  [place] 1990  Date of evaluation: 4/26/2019  Provider: Wilberto Sandoval PA-C      Chief Complaint:    Chief Complaint   Patient presents with    Chest Pain     pt presents to ED via private vehicle for cp since saturday that is not getting any better. pain 4/10 in upper left that is achy and sharp at times. pain better with rest. seen in ER on Monday with hemoptypsis that was due to irriation. recent lung abscess last year.  Testicle Pain     x years but worsening in last few weeks. US last year that was negative, pt stated he felt lumps. pain 6/10. Nursing Notes, Past Medical Hx, Past Surgical Hx, Social Hx, Allergies, and Family Hx were all reviewed and agreed with or any disagreements were addressed in the HPI.    HPI:  (Location, Duration, Timing, Severity,Quality, Assoc Sx, Context, Modifying factors)  This is a  33 y.o. male who complained of chest wall pain is been going on for the last couple weeks. He was recently seen here on Monday for the same. Said he had a CT and workup of all was negative. But the pain has not gotten any better and somewhat getting worse. Pain is located more to the left upper chest wall more laterally. Almost under the left axilla. Denies neck pain, no nausea vomiting, no diaphoresis, denies abdominal pain. Does complain of testicular scrotal pain. Decision on for the last 2 weeks as well. Thought he noticed some testicular mass or lumps. No family history of testicular cancer. Does have a family history of prostate cancer. No other complaints.       PastMedical/Surgical History:      Diagnosis Date    Pneumonia     Aspergillius fungus    Pneumonia 05/2018         Procedure Laterality Date    BRONCHOSCOPY  2018       Medications:  Previous Medications    TAMSULOSIN (FLOMAX) 0.4 MG CAPSULE    Take 0.4 mg by mouth daily         Review of Systems:  Review of Systems   Constitutional: Negative for chills and fever. HENT: Negative for congestion, facial swelling and sneezing. Eyes: Negative for discharge and redness. Respiratory: Negative for apnea, choking and shortness of breath. Cardiovascular: Positive for chest pain. Gastrointestinal: Negative for abdominal pain, nausea and vomiting. Genitourinary: Positive for testicular pain. Negative for dysuria. Musculoskeletal: Negative for back pain, neck pain and neck stiffness. Neurological: Negative for dizziness, tremors, seizures and headaches. All other systems reviewed and are negative. Positives and Pertinent negatives as per HPI. Except as noted above in the ROS, problem specific ROS was completed and is negative. Physical Exam:  Physical Exam   Constitutional: He is oriented to person, place, and time. He appears well-developed and well-nourished. HENT:   Head: Normocephalic and atraumatic. Nose: Nose normal.   Eyes: Right eye exhibits no discharge. Left eye exhibits no discharge. Neck: Normal range of motion. Neck supple. Cardiovascular: Normal rate, regular rhythm and normal heart sounds. Exam reveals no gallop and no friction rub. No murmur heard. Pulmonary/Chest: Effort normal and breath sounds normal. No respiratory distress. He has no wheezes. He has no rales. He exhibits no tenderness. Abdominal: Soft. Bowel sounds are normal. He exhibits no distension and no mass. There is no tenderness. There is no rebound and no guarding. No hernia. Musculoskeletal: Normal range of motion. Neurological: He is alert and oriented to person, place, and time. Skin: Skin is warm and dry. He is not diaphoretic. Psychiatric: He has a normal mood and affect. His behavior is normal.   Nursing note and vitals reviewed.       MEDICAL DECISION MAKING    Vitals:    Vitals:    04/26/19 1022 04/26/19 1316 04/26/19 1332 04/26/19 1346   BP: 124/82 111/68 104/72 109/61   Pulse: 74 59 62 60   Resp: 14 15 15 11   Temp: 97.6 °F (36.4 °C)   97.8 °F (36.6 °C)   TempSrc: Oral   Oral   SpO2: 99% 100% 99% 100%   Weight: 172 lb 2.9 oz (78.1 kg)      Height: 6' 2\" (1.88 m)          LABS:  Labs Reviewed   BASIC METABOLIC PANEL - Abnormal; Notable for the following components:       Result Value    Sodium 135 (*)     CREATININE 0.7 (*)     All other components within normal limits    Narrative:     Performed at:  [facility]  [address] [employer] [employer] [address]   Phone [phone]   CBC WITH AUTO DIFFERENTIAL    Narrative:     Performed at:  [facility]  [address] [employer] [employer] [address]   Phone [phone]   TROPONIN    Narrative:     Performed at:  [facility]  [address] [employer] [employer] [address]   Phone [phone]   URINE RT REFLEX TO CULTURE    Narrative:     Performed at:  [facility]  [address] [employer] [employer] [address]   Phone [phone]        Remainder of labs reviewed and werenegative at this time or not returned at the time of this note. RADIOLOGY:   Non-plain film images such as CT, Ultrasound and MRI are read by the radiologist. Darius Jansen PA-C have directly visualized the radiologic plain film image(s) with the below findings:        Interpretation per the Radiologist below, if available at the time of thisnote:    XR CHEST STANDARD (2 VW)   Final Result   No active cardiopulmonary disease         US SCROTUM AND TESTICLES   Final Result   Unremarkable testicular ultrasound with normal Doppler flow. Xr Chest Standard (2 Vw)    Result Date: 4/22/2019  EXAMINATION: TWO VIEWS OF THE CHEST 4/22/2019 6:21 pm COMPARISON: CT chest and chest radiograph November 16, 2018.  HISTORY: ORDERING SYSTEM PROVIDED HISTORY: cough TECHNOLOGIST PROVIDED HISTORY: Reason for exam:->cough Ordering Physician Provided Reason for Exam: Cough (with blood) hx of lung abcess Acuity: Acute Type of Exam: Initial FINDINGS: The lungs are without acute focal process. There is no effusion or pneumothorax. The cardiomediastinal silhouette is without acute process. The osseous structures are without acute process. No significant change compared to prior. No acute process. Ct Chest Pulmonary Embolism W Contrast    Result Date: 4/22/2019  EXAMINATION: CTA OF THE CHEST 4/22/2019 7:44 pm TECHNIQUE: CTA of the chest was performed after the administration of intravenous contrast.  Multiplanar reformatted images are provided for review. MIP images are provided for review. Dose modulation, iterative reconstruction, and/or weight based adjustment of the mA/kV was utilized to reduce the radiation dose to as low as reasonably achievable. COMPARISON: Chest CT dated 11/16/2018. HISTORY: ORDERING SYSTEM PROVIDED HISTORY: Hemoptysis. History of pulmonary abscess TECHNOLOGIST PROVIDED HISTORY: Ordering Physician Provided Reason for Exam: Shortness of Breath (hx lung abscess)  cough with blood FINDINGS: Pulmonary Arteries: Pulmonary arteries are adequately opacified for evaluation. No evidence of intraluminal filling defect to suggest pulmonary embolism. Main pulmonary artery is normal in caliber. Mediastinum: No evidence of mediastinal lymphadenopathy. The heart and pericardium demonstrate no acute abnormality. There is no acute abnormality of the thoracic aorta. Lungs/pleura: The lungs are without acute process. No focal consolidation or pulmonary edema. No evidence of pleural effusion or pneumothorax. Upper Abdomen: Limited images of the upper abdomen are unremarkable. Soft Tissues/Bones: No acute bone or soft tissue abnormality. No evidence of pulmonary embolism or acute pulmonary abnormality.         MEDICAL DECISION MAKING / ED COURSE:      PROCEDURES: Procedures    None    Patient was given:     Medications   ketorolac (TORADOL) injection 30 mg (30 mg Intravenous Given 4/26/19 1055)       Emergency room course: Patient on exam cardiovascular regular rate and rhythm. Lungs are clear. No wheeze, rales or rhonchi. He does have some reproducible chest wall tenderness more to the upper left lateral almost underneath the axilla. Non-reproducible when he brings his arm above his head or behind his back. No other chest wall tenderness noted no bruising or ecchymosis noted on the chest wall. Abdomen soft nontender. Suprapubically no tenderness. Testicles show no tenderness cannot appreciate any palpable mass on the testicle scrotum show no swelling no erythema. Penis showed no discharge no lesions. Neurologically patient is no motor sensory deficit noted. Is alert and oriented ×4. Urinalysis showed negative leukocytes negative for nitrites and negative for blood and negative for ketones. CBC has white count of 6.2, RBC 5.31, hemoglobin 15.6 and hematocrit 45.9. BMP shows sodium of 135, potassium 4.3, chloride 101 with a BUN of 14 and creatinine 0.7. Troponin less than 0.01. Chest x-ray shows no acute cardiopulmonary process. Ultrasound of scrotum or testicle is unremarkable. Dr. Cheryl Henderson  did talk to patient regarding his results and the discharge plan. Patient was okay with this plan. The patient tolerated their visit well. I evaluated the patient. The physician was available for consultation as needed. The patient and / or the family were informed of the results of anytests, a time was given to answer questions, a plan was proposed and they agreed with plan. CLINICAL IMPRESSION:  1. Pleuritic chest pain    2.  Testicular pain        DISPOSITION  DISPOSITION Decision To Discharge 04/26/2019 01:40:17 PM          PATIENT REFERRED TO:  Sybil Gosselin  [address]  [phone]    In 5

## 2019-04-26 NOTE — ED PROVIDER NOTES
[address] [facility]      Pt Name: Celso Lord  MRN: [id number]  [place] 1990  Date of evaluation: 4/26/2019  Provider: Valeria Velarde DO    CHIEF COMPLAINT  Chief Complaint   Patient presents with    Chest Pain     pt presents to ED via private vehicle for cp since saturday that is not getting any better. pain 4/10 in upper left that is achy and sharp at times. pain better with rest. seen in ER on Monday with hemoptypsis that was due to irriation. recent lung abscess last year.  Testicle Pain     x years but worsening in last few weeks. US last year that was negative, pt stated he felt lumps. pain 6/10. I have fully participated in the care of Celso Lord and have had a face-to-face evaluation. I have reviewed and agree with all pertinent clinical information, and midlevel provider's history, and physical exam. I have also reviewed the labs and imaging studies and treatment plan. I have also reviewed and agree with the medications, allergies and past medical history section for this Celso Lord. I agree with the diagnosis, and I concur. Past Medical History:   Diagnosis Date    Pneumonia     Aspergillius fungus    Pneumonia 05/2018       MDM:  Celso Lord is a 33 y.o. male who presents with left upper chest pain more than the right upper chest pain that started 4 days ago. He denies any fevers or chills. Denies any nausea or vomiting. He has had night sweats. Movement and deep breaths make it worse and rest makes it better. Does have a history of fungal infection of his long as well as bacterial abscess. He owns and works on a farm. Physical exam is unremarkable. There is mild tenderness of left upper chest. Heart sounds are normal. His workup revealed a negative testicular ultrasound and negative chest x-ray. His lab work was unremarkable. His urinalysis was negative.  Therefore, I do not have anything to treat at this time. His visitor was requesting that we treat empirically for possible exposure to bacteria to farm. However, I do not know what I would be treating him do not feel comfortable treating at this time that was explained to her. Therefore, he was discharged to follow-up with his doctor in 3-5 days and return if problems. Vitals:    04/26/19 1332   BP: 104/72   Pulse: 62   Resp: 15   Temp:    SpO2: 99%       Labs Reviewed   BASIC METABOLIC PANEL - Abnormal; Notable for the following components:       Result Value    Sodium 135 (*)     CREATININE 0.7 (*)     All other components within normal limits    Narrative:     Performed at:  [facility]  [address] [facility] [employer] [address]   Phone [phone]   CBC WITH AUTO DIFFERENTIAL    Narrative:     Performed at:  [facility]  [address] [facility] [employer] [address]   Phone [phone]   TROPONIN    Narrative:     Performed at:  [facility]  [address] [facility] [employer] [address]   Phone [phone]   URINE RT REFLEX TO CULTURE    Narrative:     Performed at:  [facility]  [address] [facility] [employer] [address]   Phone [phone]     EKG Interpretation    Interpreted by emergency department physician  Time read: 1030    Rhythm: Sinus  Ventricular Rate: 70  QRS Axis: 41  Ectopy: None  Conduction: normal  ST Segments: normal  T Waves: Peaked T waves noted in the anterior leads (V2, V3, V4)  Q Waves: None    Compared to EKG on: None to compare    Clinical Impression: Normal sinus rhythm and normal EKG    OLGA LIDIA OSWALD      See discharge instructions for specific medications, discharge information, and treatments. They were verbally instructed to return to emergency if any problems.     Medications   ketorolac (TORADOL) injection 30 mg (30 mg Intravenous Given 4/26/19 1055)       New Prescriptions MELOXICAM (MOBIC) 15 MG TABLET    Take 1 tablet by mouth daily for 10 days       The patient's blood pressure was found to be elevated according to CMS/Medicare and the Affordable Care Act/[facility] criteria. Elevated blood pressure could occur because of pain or anxiety or other reasons and does not mean that they need to have their blood pressure treated or medications otherwise adjusted. However, this could also be a sign that they will need to have their blood pressure treated or medications changed. The patient was instructed to follow up closely with their personal physician to have their blood pressure rechecked. The patient was instructed to take a list of recent blood pressure readings to their next visit with their personal physician. IMPRESSIONS:  1. Pleuritic chest pain    2.  Testicular pain                Alice Armijo DO  04/26/19 3592

## 2019-04-27 LAB
BLOOD CULTURE, ROUTINE: NORMAL
CULTURE, BLOOD 2: NORMAL

## 2020-01-25 ENCOUNTER — HOSPITAL ENCOUNTER (EMERGENCY)
Age: 30
Discharge: HOME | End: 2020-01-25
Attending: EMERGENCY MEDICINE
Payer: COMMERCIAL

## 2020-01-25 ENCOUNTER — APPOINTMENT (OUTPATIENT)
Dept: CT IMAGING | Age: 30
End: 2020-01-25
Payer: COMMERCIAL

## 2020-01-25 VITALS
RESPIRATION RATE: 16 BRPM | SYSTOLIC BLOOD PRESSURE: 110 MMHG | BODY MASS INDEX: 23.35 KG/M2 | HEART RATE: 58 BPM | WEIGHT: 181.88 LBS | DIASTOLIC BLOOD PRESSURE: 67 MMHG | TEMPERATURE: 98 F | OXYGEN SATURATION: 97 %

## 2020-01-25 LAB
A/G RATIO: 1.3 (ref 1.1–2.2)
ALBUMIN SERPL-MCNC: 4.4 G/DL (ref 3.4–5)
ALP BLD-CCNC: 106 U/L (ref 40–129)
ALT SERPL-CCNC: 32 U/L (ref 10–40)
ANION GAP SERPL CALCULATED.3IONS-SCNC: 13 MMOL/L (ref 3–16)
AST SERPL-CCNC: 16 U/L (ref 15–37)
BILIRUB SERPL-MCNC: 0.3 MG/DL (ref 0–1)
BUN BLDV-MCNC: 23 MG/DL (ref 7–20)
CALCIUM SERPL-MCNC: 9.5 MG/DL (ref 8.3–10.6)
CHLORIDE BLD-SCNC: 101 MMOL/L (ref 99–110)
CO2: 26 MMOL/L (ref 21–32)
CREAT SERPL-MCNC: 0.8 MG/DL (ref 0.9–1.3)
GFR AFRICAN AMERICAN: >60
GFR NON-AFRICAN AMERICAN: >60
GLOBULIN: 3.4 G/DL
GLUCOSE BLD-MCNC: 125 MG/DL (ref 70–99)
HCT VFR BLD CALC: 44.4 % (ref 40.5–52.5)
HEMOGLOBIN: 14.5 G/DL (ref 13.5–17.5)
MCH RBC QN AUTO: 28.3 PG (ref 26–34)
MCHC RBC AUTO-ENTMCNC: 32.6 G/DL (ref 31–36)
MCV RBC AUTO: 86.8 FL (ref 80–100)
PDW BLD-RTO: 11.9 % (ref 12.4–15.4)
PLATELET # BLD: 379 K/UL (ref 135–450)
PMV BLD AUTO: 7.7 FL (ref 5–10.5)
POTASSIUM REFLEX MAGNESIUM: 4.3 MMOL/L (ref 3.5–5.1)
RAPID INFLUENZA  B AGN: NEGATIVE
RAPID INFLUENZA A AGN: NEGATIVE
RBC # BLD: 5.12 M/UL (ref 4.2–5.9)
SODIUM BLD-SCNC: 140 MMOL/L (ref 136–145)
TOTAL PROTEIN: 7.8 G/DL (ref 6.4–8.2)
WBC # BLD: 13.4 K/UL (ref 4–11)

## 2020-01-25 PROCEDURE — 36415 COLL VENOUS BLD VENIPUNCTURE: CPT

## 2020-01-25 PROCEDURE — 80053 COMPREHEN METABOLIC PANEL: CPT

## 2020-01-25 PROCEDURE — 85027 COMPLETE CBC AUTOMATED: CPT

## 2020-01-25 PROCEDURE — 6360000004 HC RX CONTRAST MEDICATION: Performed by: EMERGENCY MEDICINE

## 2020-01-25 PROCEDURE — 87804 INFLUENZA ASSAY W/OPTIC: CPT

## 2020-01-25 PROCEDURE — 99283 EMERGENCY DEPT VISIT LOW MDM: CPT

## 2020-01-25 PROCEDURE — 71260 CT THORAX DX C+: CPT

## 2020-01-25 RX ORDER — PREDNISONE 20 MG/1
20 TABLET ORAL DAILY
COMMUNITY
End: 2020-03-11

## 2020-01-25 RX ORDER — CEFUROXIME AXETIL 250 MG/1
500 TABLET ORAL 2 TIMES DAILY
COMMUNITY
End: 2020-03-24 | Stop reason: CLARIF

## 2020-01-25 RX ADMIN — IOPAMIDOL 100 ML: 755 INJECTION, SOLUTION INTRAVENOUS at 20:58

## 2020-01-26 NOTE — ED PROVIDER NOTES
Cardiovascular:  Normal heart rate, Normal rhythm, No murmurs, No rubs, No gallops. GI:  Bowel sounds normal, Soft, No tenderness, No masses, No pulsatile masses. Musculoskeletal:  Intact distal pulses, No edema, No tenderness, No cyanosis, No clubbing. Good range of motion in all major joints. No tenderness to palpation or major deformities noted. Back- No tenderness. Integument:  Warm, Dry, No erythema, No rash. Lymphatic:  No lymphadenopathy noted. Neurologic:  Alert & oriented x 3, Normal motor function, Normal sensory function, No focal deficits noted. Psychiatric:  Affect normal, Judgment normal, Mood normal.     EKG        RADIOLOGY    CT CHEST PULMONARY EMBOLISM W CONTRAST   Final Result   1. No acute pulmonary thromboembolic disease. 2.  Mild left greater than right upper lobe ground-glass and small nodular   opacities are most suggestive of an infectious/inflammatory process. No   pulmonary mass, consolidation or abscess. Labs Reviewed   CBC - Abnormal; Notable for the following components:       Result Value    WBC 13.4 (*)     RDW 11.9 (*)     All other components within normal limits    Narrative:     Performed at:  [facility]  [address] [facility]   Phone [phone]   COMPREHENSIVE METABOLIC PANEL W/ REFLEX TO MG FOR LOW K - Abnormal; Notable for the following components:    Glucose 125 (*)     BUN 23 (*)     CREATININE 0.8 (*)     All other components within normal limits    Narrative:     Performed at:  [facility]  [address] [facility]   Phone [phone]   RAPID INFLUENZA A/B ANTIGENS    Narrative:     Performed at:  [facility]  [address]  [facility]   Phone [phone]         PROCEDURES        ED [address] [facility]    Pertinent Labs & Imaging studies reviewed.  (See chart for details)  I will find anything significant on physical examination. He is well-appearing and is not toxic or septic. Vital signs are stable and he is afebrile. He is not tachycardic or hypoxic or tachypneic. His lungs are clear on examination as well. He is not diaphoretic. Skin is warm and dry. I ordered a CT scan of the chest to evaluate this hemoptysis. Differential diagnosis includes pulmonary embolism, pneumonia, cavitary abscess, atypical infection, viral URI. Influenza swab is negative. CT scan came back negative for pulmonary embolism but also negative for cavitary lesion. Is suggested infectious process but the patient I confirmed is actually still taking antibiotic and he has not done with the course so I want him to continue taking it. His wife was concerned that his white blood cell count would be elevated even though he is taking antibiotic. I informed her that prednisone can do this and that the white blood cell count is very nonspecific and not necessarily a marker for following infection. At any rate, this patient is nontoxic-appearing with normal vital signs. I think he can be safely discharged home. He has a pulmonologist with whom he is supposed to follow-up. He is scheduled to have a CT of the chest on Tuesday but I do not think he needs it now because I did it tonight. FINAL IMPRESSION    1.  Acute upper respiratory infection            Nahum Mantilla MD  01/25/20 3451

## 2020-01-26 NOTE — ED TRIAGE NOTES
Pt to ED for complaints of coughing up blood for past 7 days, worse with intermittent fever past 2 days.  called his pulmonologist and was started on antibiotic and Prednisone 5 days ago.  had a lung abscess 2 years ago that was diagnosed upon CT scan.

## 2020-02-12 ENCOUNTER — OFFICE VISIT (OUTPATIENT)
Dept: INFECTIOUS DISEASES | Age: 30
End: 2020-02-12
Payer: COMMERCIAL

## 2020-02-12 VITALS
HEIGHT: 74 IN | DIASTOLIC BLOOD PRESSURE: 66 MMHG | OXYGEN SATURATION: 99 % | WEIGHT: 181 LBS | BODY MASS INDEX: 23.23 KG/M2 | HEART RATE: 77 BPM | RESPIRATION RATE: 18 BRPM | SYSTOLIC BLOOD PRESSURE: 124 MMHG

## 2020-02-12 DIAGNOSIS — R04.2 HEMOPTYSIS: ICD-10-CM

## 2020-02-12 DIAGNOSIS — R93.89 ABNORMAL CT OF THE CHEST: ICD-10-CM

## 2020-02-12 PROBLEM — B44.9 ASPERGILLUS PNEUMONIA (HCC): Status: ACTIVE | Noted: 2020-02-12

## 2020-02-12 PROCEDURE — 99215 OFFICE O/P EST HI 40 MIN: CPT | Performed by: INTERNAL MEDICINE

## 2020-02-12 RX ORDER — VORICONAZOLE 200 MG/1
200 TABLET, FILM COATED ORAL 2 TIMES DAILY
Qty: 60 TABLET | Refills: 6 | Status: SHIPPED | OUTPATIENT
Start: 2020-02-12 | End: 2020-03-18 | Stop reason: SDUPTHER

## 2020-02-12 RX ORDER — MONTELUKAST SODIUM 10 MG/1
TABLET ORAL
COMMUNITY
Start: 2020-02-05 | End: 2020-06-10 | Stop reason: CLARIF

## 2020-02-12 RX ORDER — BUDESONIDE AND FORMOTEROL FUMARATE DIHYDRATE 80; 4.5 UG/1; UG/1
AEROSOL RESPIRATORY (INHALATION)
COMMUNITY
Start: 2020-02-11 | End: 2020-02-12 | Stop reason: CLARIF

## 2020-02-12 RX ORDER — BUDESONIDE AND FORMOTEROL FUMARATE DIHYDRATE 80; 4.5 UG/1; UG/1
1 AEROSOL RESPIRATORY (INHALATION)
COMMUNITY
End: 2020-06-10 | Stop reason: ALTCHOICE

## 2020-02-12 NOTE — PROGRESS NOTES
BRONCHOSCOPY  2018       Current Medications:    Current Outpatient Medications   Medication Sig Dispense Refill    budesonide-formoterol (SYMBICORT) 80-4.5 MCG/ACT AERO Inhale 1 puff into the lungs      montelukast (SINGULAIR) 10 MG tablet       voriconazole (VFEND) 200 MG tablet Take 1 tablet by mouth 2 times daily 60 tablet 6    tamsulosin (FLOMAX) 0.4 MG capsule Take 0.4 mg by mouth daily      oseltamivir (TAMIFLU) 75 MG capsule Take 1 capsule by mouth daily for 5 days 5 capsule 0    cefUROXime (CEFTIN) 250 MG tablet Take 250 mg by mouth 2 times daily      predniSONE (DELTASONE) 20 MG tablet Take 20 mg by mouth daily       No current facility-administered medications for this visit. Allergies:  Patient has no known allergies.       Immunizations :   Immunization History   Administered Date(s) Administered    PPD Test 06/05/2018           Social History:    Social History     Socioeconomic History    Marital status:      Spouse name: None    Number of children: None    Years of education: None    Highest education level: None   Occupational History    None   Social Needs    Financial resource strain: None    Food insecurity:     Worry: None     Inability: None    Transportation needs:     Medical: None     Non-medical: None   Tobacco Use    Smoking status: Never Smoker    Smokeless tobacco: Never Used   Substance and Sexual Activity    Alcohol use: Yes     Comment: rarely    Drug use: No    Sexual activity: Yes     Partners: Female   Lifestyle    Physical activity:     Days per week: None     Minutes per session: None    Stress: None   Relationships    Social connections:     Talks on phone: None     Gets together: None     Attends Qu[employer] service: None     Active member of club or organization: None     Attends meetings of clubs or organizations: None     Relationship status: None    Intimate partner violence:     Fear of current or ex partner: None     Emotionally abused: None     Physically abused: None     Forced sexual activity: None   Other Topics Concern    None   Social History Narrative    None     Family History : no DVT no COPD        REVIEW OF SYSTEMS:    No fevers, chills, sweats. No weight loss. No visual change, eye pain, eye discharge. No oral lesions, sore throat, dysphagia. Denies cough / sputum. Denies chest pain, palpitations. Denies nausea, vomiting, abdominal pain, no diarrhea. Denies dysuria or change in urinary function. Denies joint swelling or pain. No myalgia, arthralgia. Denies focal weakness, sensory change or other neurologic symptoms  No lymph node swelling or tenderness. No symptoms endocrinopathy. No symptoms hematologic disease.   Hemoptysis,     PHYSICAL EXAM:    Vitals:    /66 (Site: Left Upper Arm, Position: Sitting, Cuff Size: Medium Adult)   Pulse 77   Resp 18   Ht 6' 2\" (1.88 m)   Wt 181 lb (82.1 kg)   SpO2 99%   BMI 23.24 kg/m²   General Appearance: alert,  in no acute distress, no pallor, no icterus   Skin: warm and dry, no rash or erythema  Head: normocephalic and atraumatic  Eyes: pupils equal, round, and reactive to light, conjunctivae normal  ENT: tympanic membrane, external ear and ear canal normal bilaterally, nose without deformity, nasal mucosa and turbinates normal without polyps  Neck: supple and non-tender without mass, no thyromegaly or thyroid nodules, no cervical lymphadenopathy  Pulmonary/Chest: clear to auscultation bilaterally- no wheezes, rales or rhonchi, normal air movement,  Cardiovascular: normal rate, regular rhythm, normal S1 and S2, no murmurs, rubs, clicks, or gallops,   Abdomen: soft, non-tender, non-distended, normal bowel sounds, no masses or organomegaly  Extremities: no cyanosis, clubbing or edema  Musculoskeletal: normal range of motion, no joint swelling, deformity or tenderness  Neurologic: reflexes normal and symmetric, no cranial nerve deficit,   Psych:  Orientation, sensorium, mood normal           DATA:    See EPIC  Lab Results   Component Value Date    WBC 13.4 (H) 2020    HGB 14.5 2020    HCT 44.4 2020    MCV 86.8 2020     2020     Lab Results   Component Value Date    CREATININE 0.8 (L) 2020    BUN 23 (H) 2020     2020    K 4.3 2020     2020    CO2 26 2020       Hepatic Function Panel:   Lab Results   Component Value Date    ALKPHOS 106 2020    ALT 32 2020    AST 16 2020    PROT 7.8 2020    BILITOT 0.3 2020    BILIDIR <0.2 2018    IBILI see below 2018    LABALBU 4.4 2020     UA:  Lab Results   Component Value Date    COLORU YELLOW 2019    CLARITYU Clear 2019    GLUCOSEU Negative 2019    BILIRUBINUR Negative 2019    KETUA Negative 2019    SPECGRAV 1.009 2019    BLOODU Negative 2019    PHUR 7.5 2019    PROTEINU Negative 2019    UROBILINOGEN 0.2 2019    NITRU Negative 2019    LEUKOCYTESUR Negative 2019    LABMICR Not Indicated 2019    URINETYPE Not Specified 2019        No orders to display   Department of Pathology  FINAL CYTOLOGY REPORT  Patient Name: Maricruz Whittaker      Accession No:  [id number]   Age Sex:   1990   28 Y / M        Location:      [facility]  Account No:   [de-identified]                 Collected:     2018  Med Rec No:    [id number]                 Received:      2018  Attend Phys:   BELEN PEREZ DO       Completed:     08/10/2018  Perform Phys: Tatyana PEREZ DO          FINAL DIAGNOSIS:    Bronchial Alveolar Lavage, Left Upper Lobe:  -  No malignant cells identified      Fungus (Mycology) Culture 2018 12:50 PM [address] [facility]   Fungus Stain 2018 12:50 PM [address] [facility]   No Fungal elements seen    Organism Abnormal  2018 12:50 PM [address] [facility]   Aspergillus species

## 2020-02-14 LAB
(1,3)-BETA-D-GLUCAN (FUNGITELL) INTERPRETATION: POSITIVE
(1,3)-BETA-D-GLUCAN (FUNGITELL): 84 PG/ML
ASPERGILLUS GALACTO AG: NEGATIVE
ASPERGILLUS GALACTO INDEX: 0.06

## 2020-02-17 ENCOUNTER — TELEPHONE (OUTPATIENT)
Dept: INFECTIOUS DISEASES | Age: 30
End: 2020-02-17

## 2020-02-17 LAB — ASPERGILLUS ANTIBODY ID: NORMAL

## 2020-02-17 NOTE — TELEPHONE ENCOUNTER
Wife called wanting to get a more clear answer for what type of fungal infection her  has. She said that he was not clear about exactly what doctor said so they are just looking for some clarification.

## 2020-02-17 NOTE — TELEPHONE ENCOUNTER
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

## 2020-02-17 NOTE — TELEPHONE ENCOUNTER
[facility] called as Chase Rodriguez with drug interactions between the Flomax and he Voriconazole.

## 2020-02-19 ENCOUNTER — TELEPHONE (OUTPATIENT)
Dept: INFECTIOUS DISEASES | Age: 30
End: 2020-02-19

## 2020-02-19 RX ORDER — OSELTAMIVIR PHOSPHATE 75 MG/1
75 CAPSULE ORAL DAILY
Qty: 5 CAPSULE | Refills: 0 | Status: SHIPPED | OUTPATIENT
Start: 2020-02-19 | End: 2020-02-24

## 2020-02-19 NOTE — TELEPHONE ENCOUNTER
Wife is waiting for a call re: further explanation of 's condition. Please call her at [phone].

## 2020-02-19 NOTE — TELEPHONE ENCOUNTER
Spoke to pts wife and explained the results and she has Influenza A and she wants prophylaxis for her  and Meds sent to his pharmacy Tamiflu x 75 mg daily x 5 days

## 2020-02-20 ENCOUNTER — TELEPHONE (OUTPATIENT)
Dept: INFECTIOUS DISEASES | Age: 30
End: 2020-02-20

## 2020-02-26 ENCOUNTER — TELEPHONE (OUTPATIENT)
Dept: INFECTIOUS DISEASES | Age: 30
End: 2020-02-26

## 2020-02-26 NOTE — TELEPHONE ENCOUNTER
I do not think it affects while checking at home unless there is demolition and consutruction  No interaction with Omeprazole   will have my notes faxed over we did get the results of Bronchoscopy- and will touch base

## 2020-02-26 NOTE — TELEPHONE ENCOUNTER
Attempted to call and no answer and unable to leave message. Progress note and lab results faxed to Dr. Kali Bunn at [phone].

## 2020-03-02 ENCOUNTER — TELEPHONE (OUTPATIENT)
Dept: INFECTIOUS DISEASES | Age: 30
End: 2020-03-02

## 2020-03-02 NOTE — TELEPHONE ENCOUNTER
Spoke to patients wife and gave her Dr. Priscilla Price message. She verbalized understanding and stated she would take  Him to the ED if symptoms worsen or he develops more.

## 2020-03-02 NOTE — TELEPHONE ENCOUNTER
It may be viral resp tract infection and flu testing can be false negative he was supposed to be on tamiflu I send in when his wife had Flu and if more symptoms may have to get checked in ED for now can cont symptomatic therapy or may need resp viral PCR testing done

## 2020-03-03 ENCOUNTER — TELEPHONE (OUTPATIENT)
Dept: INFECTIOUS DISEASES | Age: 30
End: 2020-03-03

## 2020-03-03 NOTE — TELEPHONE ENCOUNTER
Patient states he had lab work ordered by Dr. Isaiah Carvalho ( ?speelrining) at [facility] in [facility]. Per patient,  WBC - 11.3  (high)                       Neutrophils - 9.9    Wanted Dr. Miky Jean to be aware.

## 2020-03-06 LAB
CULTURE, RESPIRATORY: ABNORMAL
CULTURE, RESPIRATORY: ABNORMAL
GRAM STAIN RESULT: ABNORMAL
ORGANISM: ABNORMAL

## 2020-03-07 RX ORDER — AMOXICILLIN AND CLAVULANATE POTASSIUM 875; 125 MG/1; MG/1
1 TABLET, FILM COATED ORAL 2 TIMES DAILY
Qty: 14 TABLET | Refills: 0
Start: 2020-03-07 | End: 2020-03-11

## 2020-03-11 ENCOUNTER — OFFICE VISIT (OUTPATIENT)
Dept: INFECTIOUS DISEASES | Age: 30
End: 2020-03-11
Payer: COMMERCIAL

## 2020-03-11 VITALS
HEART RATE: 78 BPM | DIASTOLIC BLOOD PRESSURE: 84 MMHG | SYSTOLIC BLOOD PRESSURE: 126 MMHG | TEMPERATURE: 97.5 F | BODY MASS INDEX: 22.59 KG/M2 | WEIGHT: 176 LBS | RESPIRATION RATE: 16 BRPM | OXYGEN SATURATION: 98 % | HEIGHT: 74 IN

## 2020-03-11 DIAGNOSIS — B44.9 ASPERGILLUS PNEUMONIA (HCC): ICD-10-CM

## 2020-03-11 DIAGNOSIS — R93.89 ABNORMAL CT OF THE CHEST: ICD-10-CM

## 2020-03-11 LAB
A/G RATIO: 1.5 (ref 1.1–2.2)
ALBUMIN SERPL-MCNC: 4.8 G/DL (ref 3.4–5)
ALP BLD-CCNC: 134 U/L (ref 40–129)
ALT SERPL-CCNC: 42 U/L (ref 10–40)
ANION GAP SERPL CALCULATED.3IONS-SCNC: 12 MMOL/L (ref 3–16)
AST SERPL-CCNC: 27 U/L (ref 15–37)
BASOPHILS ABSOLUTE: 0 K/UL (ref 0–0.2)
BASOPHILS RELATIVE PERCENT: 0.5 %
BILIRUB SERPL-MCNC: 0.3 MG/DL (ref 0–1)
BUN BLDV-MCNC: 17 MG/DL (ref 7–20)
CALCIUM SERPL-MCNC: 10.1 MG/DL (ref 8.3–10.6)
CHLORIDE BLD-SCNC: 101 MMOL/L (ref 99–110)
CO2: 27 MMOL/L (ref 21–32)
CREAT SERPL-MCNC: 0.8 MG/DL (ref 0.9–1.3)
EOSINOPHILS ABSOLUTE: 0.2 K/UL (ref 0–0.6)
EOSINOPHILS RELATIVE PERCENT: 2.3 %
GFR AFRICAN AMERICAN: >60
GFR NON-AFRICAN AMERICAN: >60
GLOBULIN: 3.1 G/DL
GLUCOSE BLD-MCNC: 77 MG/DL (ref 70–99)
HCT VFR BLD CALC: 45 % (ref 40.5–52.5)
HEMOGLOBIN: 14.9 G/DL (ref 13.5–17.5)
LYMPHOCYTES ABSOLUTE: 1.7 K/UL (ref 1–5.1)
LYMPHOCYTES RELATIVE PERCENT: 17.6 %
MCH RBC QN AUTO: 29.3 PG (ref 26–34)
MCHC RBC AUTO-ENTMCNC: 33.2 G/DL (ref 31–36)
MCV RBC AUTO: 88.2 FL (ref 80–100)
MONOCYTES ABSOLUTE: 0.8 K/UL (ref 0–1.3)
MONOCYTES RELATIVE PERCENT: 8.1 %
NEUTROPHILS ABSOLUTE: 7.1 K/UL (ref 1.7–7.7)
NEUTROPHILS RELATIVE PERCENT: 71.5 %
PDW BLD-RTO: 13.5 % (ref 12.4–15.4)
PLATELET # BLD: 309 K/UL (ref 135–450)
PMV BLD AUTO: 8.5 FL (ref 5–10.5)
POTASSIUM SERPL-SCNC: 4.5 MMOL/L (ref 3.5–5.1)
RBC # BLD: 5.1 M/UL (ref 4.2–5.9)
SODIUM BLD-SCNC: 140 MMOL/L (ref 136–145)
TOTAL PROTEIN: 7.9 G/DL (ref 6.4–8.2)
WBC # BLD: 9.9 K/UL (ref 4–11)

## 2020-03-11 PROCEDURE — 99214 OFFICE O/P EST MOD 30 MIN: CPT | Performed by: INTERNAL MEDICINE

## 2020-03-11 RX ORDER — OMEPRAZOLE 40 MG/1
CAPSULE, DELAYED RELEASE ORAL
COMMUNITY
Start: 2020-02-26 | End: 2020-06-10 | Stop reason: ALTCHOICE

## 2020-03-11 RX ORDER — DOXYCYCLINE HYCLATE 100 MG/1
CAPSULE ORAL
COMMUNITY
Start: 2020-03-04 | End: 2020-03-24 | Stop reason: CLARIF

## 2020-03-11 RX ORDER — TIOTROPIUM BROMIDE 18 UG/1
CAPSULE ORAL; RESPIRATORY (INHALATION)
COMMUNITY
End: 2020-06-10 | Stop reason: ALTCHOICE

## 2020-03-11 NOTE — PROGRESS NOTES
Outpatient Medications   Medication Sig Dispense Refill    omeprazole (PRILOSEC) 40 MG delayed release capsule       tiotropium (SPIRIVA HANDIHALER) 18 MCG inhalation capsule Spiriva with HandiHaler 18 mcg and inhalation capsules   Inhale by inhalation route.  budesonide-formoterol (SYMBICORT) 80-4.5 MCG/ACT AERO Inhale 1 puff into the lungs      montelukast (SINGULAIR) 10 MG tablet       cefUROXime (CEFTIN) 250 MG tablet Take 500 mg by mouth 2 times daily       doxycycline hyclate (VIBRAMYCIN) 100 MG capsule        No current facility-administered medications for this visit. Allergies:  Patient has no known allergies.       Immunizations :   Immunization History   Administered Date(s) Administered    PPD Test 06/05/2018           Social History:    Social History     Socioeconomic History    Marital status:      Spouse name: None    Number of children: None    Years of education: None    Highest education level: None   Occupational History    None   Social Needs    Financial resource strain: None    Food insecurity     Worry: None     Inability: None    Transportation needs     Medical: None     Non-medical: None   Tobacco Use    Smoking status: Never Smoker    Smokeless tobacco: Never Used   Substance and Sexual Activity    Alcohol use: Yes     Comment: rarely    Drug use: No    Sexual activity: Yes     Partners: Female   Lifestyle    Physical activity     Days per week: None     Minutes per session: None    Stress: None   Relationships    Social connections     Talks on phone: None     Gets together: None     Attends Restoration service: None     Active member of club or organization: None     Attends meetings of clubs or organizations: None     Relationship status: None    Intimate partner violence     Fear of current or ex partner: None     Emotionally abused: None     Physically abused: None     Forced sexual activity: None   Other Topics Concern    None   Social History Narrative    None     Family History : no DVT no COPD        REVIEW OF SYSTEMS:    No fevers, chills, sweats. No weight loss. No visual change, eye pain, eye discharge. No oral lesions, sore throat, dysphagia. Denies cough / sputum. Denies chest pain, palpitations. Denies nausea, vomiting, abdominal pain, no diarrhea. Denies dysuria or change in urinary function. Denies joint swelling or pain. No myalgia, arthralgia. Denies focal weakness, sensory change or other neurologic symptoms  No lymph node swelling or tenderness. No symptoms endocrinopathy. No symptoms hematologic disease.   COUGH, SPUTUM  PHYSICAL EXAM:    Vitals:    /84 (Site: Left Upper Arm, Position: Sitting, Cuff Size: Medium Adult)   Pulse 78   Temp 97.5 °F (36.4 °C) (Oral)   Resp 16   Ht 6' 2\" (1.88 m)   Wt 176 lb (79.8 kg)   SpO2 98%   BMI 22.60 kg/m²   General Appearance: alert,  in no acute distress, no pallor, no icterus   Skin: warm and dry, no rash or erythema  Head: normocephalic and atraumatic  Eyes: pupils equal, round, and reactive to light, conjunctivae normal  ENT: tympanic membrane, external ear and ear canal normal bilaterally, nose without deformity, nasal mucosa and turbinates normal without polyps  Neck: supple and non-tender without mass, no thyromegaly or thyroid nodules, no cervical lymphadenopathy  Pulmonary/Chest: clear to auscultation bilaterally- no wheezes, rales or rhonchi, normal air movement,  Cardiovascular: normal rate, regular rhythm, normal S1 and S2, no murmurs, rubs, clicks, or gallops,   Abdomen: soft, non-tender, non-distended, normal bowel sounds, no masses or organomegaly  Extremities: no cyanosis, clubbing or edema  Musculoskeletal: normal range of motion, no joint swelling, deformity or tenderness  Neurologic: reflexes normal and symmetric, no cranial nerve deficit,   Psych:  Orientation, sensorium, mood normal           DATA:    See EPIC  Lab Results   Component Value Date    WBC Sputum Expectorated                COLLECTED:  03/05/20 11:50  ANTIBIOTICS AT [facility]. :                      RECEIVED :  03/05/20 11:50  Performed at:       CT CHEST 3/4 bi lateral infiltrates new from previous CT noted    Labs, Microbiology and  Radiology results pertinent to this visit and from care every where  reviewed in detail by me as part of the consultation     IMPRESSION:     Diagnosis Orders   1. Abnormal CT of the chest  CBC Auto Differential    COMPREHENSIVE METABOLIC PANEL   2. Aspergillus pneumonia ([address]  CBC Auto Differential    COMPREHENSIVE METABOLIC PANEL   3. Hemoptysis       He had a recent history of hemoptysis underwent bronchoscopy locally no endobronchial lesions were noted. And bronchoscopy cultures were all negative. He did have a history of aspergillus on the BAL culture back in 2018 he tolerated course of voriconazole back then. He was recently evaluated for recurrence of symptoms given history of I repeated Beta D -glucan came back positive now was placed on a course of oral voriconazole. It looks like he recently had a respiratory tract infection with bilateral pneumonia and sputum culture positive for Haemophilus parainfluenza. CT chest from 3/4 with  bilateral infiltrative changes consistent with a bacterial process rather than a fungal process at this time. G denies rash  PLAN:    1. Cont Oral Voriconazole x 200 mg x Q 12HRS  2. Check CBC with dif, CMP  3. Finish Cefuroxime course and can hold off Augmentin therapy  4. Can discontinue Oral Doxycycline   5. There is a plan for repeat CT from Dr. Meryle Lam in x 3 weeks per patient  6. In the mean time we have Sputum for AFB in process  7. Follow up x 8 weeks  8. Will arrange Follow up with  [facility]       More than 50% of Face-to- Face time was spent in counseling and/or coordination of care including  reviewing data, discussing multiple problems and medications, formulating a plan.     D/w Patient in detail at  the time of consultation. Thanks for allowing me to participate in your patient's care and please do not hesitate to  call me with any questions or concerns.     Michael Soria MD  Infectious Disease  [facility] ([facility]) Physician  Phone: [phone]   Fax : [phone]

## 2020-03-18 RX ORDER — VORICONAZOLE 200 MG/1
200 TABLET, FILM COATED ORAL 2 TIMES DAILY
Qty: 60 TABLET | Refills: 6 | Status: SHIPPED | OUTPATIENT
Start: 2020-03-18 | End: 2020-04-17

## 2020-03-23 ENCOUNTER — TELEPHONE (OUTPATIENT)
Dept: INFECTIOUS DISEASES | Age: 30
End: 2020-03-23

## 2020-03-23 NOTE — TELEPHONE ENCOUNTER
Patient requesting call with nurse to discuss potential need for blood work due to medication. Please advise. Patient appointment rescheduled from 4/15/20 to 6/10/20 based on CDC recommended COVID-19 pandemic precautions.

## 2020-03-24 ENCOUNTER — VIRTUAL VISIT (OUTPATIENT)
Dept: PULMONOLOGY | Age: 30
End: 2020-03-24
Payer: COMMERCIAL

## 2020-03-24 PROCEDURE — 99442 PR PHYS/QHP TELEPHONE EVALUATION 11-20 MIN: CPT | Performed by: INTERNAL MEDICINE

## 2020-03-24 RX ORDER — ALBUTEROL SULFATE 90 UG/1
2 AEROSOL, METERED RESPIRATORY (INHALATION) EVERY 4 HOURS PRN
Qty: 1 INHALER | Refills: 2 | Status: SHIPPED | OUTPATIENT
Start: 2020-03-24 | End: 2021-06-09 | Stop reason: ALTCHOICE

## 2020-03-24 NOTE — PROGRESS NOTES
Attends meetings of clubs or organizations: Not on file     Relationship status: Not on file    Intimate partner violence     Fear of current or ex partner: Not on file     Emotionally abused: Not on file     Physically abused: Not on file     Forced sexual activity: Not on file   Other Topics Concern    Not on file   Social History Narrative    Not on file       No family history on file. Current Outpatient Medications:     albuterol sulfate HFA (PROAIR HFA) 108 (90 Base) MCG/ACT inhaler, Inhale 2 puffs into the lungs every 4 hours as needed for Wheezing or Shortness of Breath, Disp: 1 Inhaler, Rfl: 2    voriconazole (VFEND) 200 MG tablet, Take 1 tablet by mouth 2 times daily, Disp: 60 tablet, Rfl: 6    omeprazole (PRILOSEC) 40 MG delayed release capsule, , Disp: , Rfl:     tiotropium (SPIRIVA HANDIHALER) 18 MCG inhalation capsule, Spiriva with HandiHaler 18 mcg and inhalation capsules  Inhale by inhalation route., Disp: , Rfl:     budesonide-formoterol (SYMBICORT) 80-4.5 MCG/ACT AERO, Inhale 1 puff into the lungs, Disp: , Rfl:     montelukast (SINGULAIR) 10 MG tablet, , Disp: , Rfl:       ROS:  GENERAL:  Another bout of pneumonia  HEENT:  No double vision, blurry vision, or difficulty swallowing  HEART:  No chest pain, no palpitations  LUNGS:  Was more SOB with pneumonia, now improved   ABDOMEN:  No abdominal pains, no changes in stools  GENITOURINARY:  No increased frequency, no blood in urine  EXTREMITIES:  No swelling, no lesions  NEURO:  No numbness or tingling, no seizures  SKIN:  No new rashes, no changes in hair or nails  LYMPH:  No masses, no swelling neck, armpits, or groin    PHYSICAL EXAM:  Unable due to telephone     Pulmonary Function Testing  None    Chest imaging from 1/2020.   My impression is ZOYA ground glass and nodular opacities     ECHO  None  Lab Results   Component Value Date    WBC 9.9 03/11/2020    HGB 14.9 03/11/2020    HCT 45.0 03/11/2020    MCV 88.2 03/11/2020     03/11/2020       No results found for: BNP    Lab Results   Component Value Date    CREATININE 0.8 (L) 03/11/2020    BUN 17 03/11/2020     03/11/2020    K 4.5 03/11/2020     03/11/2020    CO2 27 03/11/2020         Assessment/Plan:  1. Pneumonia of left upper lobe due to infectious organism ([address]  Recent h. Parainfluenzae infection. Treated with antibiotics and improved. Now with possible recurrent aspergillus infection. He keeps on having recurrent infiltrates. On vfend for possible 6 months with Dr. Ghassan Dillard. Will need updated CT in the future but not now. May need to be evaluated for ABPA but does not have bronchiectasis on CT. Interesting case. Does not appear to be immune compromised but keeps getting infected    2. Abnormal CT of the chest  Due to recurrent infections. Does not have structural lung disease     3. Mild intermittent asthma without complication  Not clear if he has this. Still needs to do PFTs. Has not done them yet. I would stop symbicort (possible increase risk of pneumonia in 30 days with onset) and spriiva. Use albuterol prn. Start simply and add on if need be. I told him if he goes to see another pulmonologist he may get different answer. He said he would stick with me.    - albuterol sulfate HFA (PROAIR HFA) 108 (90 Base) MCG/ACT inhaler; Inhale 2 puffs into the lungs every 4 hours as needed for Wheezing or Shortness of Breath  Dispense: 1 Inhaler;  Refill: 2    TELEPHONE ENCOUNTER FOR 16:40 min    Follow up in 2 months

## 2020-03-24 NOTE — PATIENT INSTRUCTIONS
Recommend holding spiriva and symbicort    Use albuterol as needed    Finish Vfend    Follow up in 2 months

## 2020-04-21 ENCOUNTER — TELEPHONE (OUTPATIENT)
Dept: INFECTIOUS DISEASES | Age: 30
End: 2020-04-21

## 2020-04-21 LAB
AFB CULTURE (MYCOBACTERIA): NORMAL
AFB SMEAR: NORMAL

## 2020-04-21 NOTE — TELEPHONE ENCOUNTER
Spoke with patient and sent lab order to [address] [place] [address] outpatient lab. Faxed to [phone].

## 2020-04-23 LAB
ALBUMIN SERPL-MCNC: 5.1 G/DL
ALP BLD-CCNC: 70 U/L
ALT SERPL-CCNC: 31 U/L
ANION GAP SERPL CALCULATED.3IONS-SCNC: NORMAL MMOL/L
AST SERPL-CCNC: 26 U/L
BILIRUB SERPL-MCNC: 0.6 MG/DL (ref 0.1–1.4)
BUN BLDV-MCNC: 20 MG/DL
CALCIUM SERPL-MCNC: 9.9 MG/DL
CHLORIDE BLD-SCNC: 105 MMOL/L
CO2: 26 MMOL/L
CREAT SERPL-MCNC: 1.09 MG/DL
GFR CALCULATED: NORMAL
GLUCOSE BLD-MCNC: 96 MG/DL
POTASSIUM SERPL-SCNC: 3.9 MMOL/L
SODIUM BLD-SCNC: 141 MMOL/L
TOTAL PROTEIN: 7.9

## 2020-04-27 RX ORDER — VORICONAZOLE 200 MG/1
200 TABLET, FILM COATED ORAL 2 TIMES DAILY
Qty: 60 TABLET | Refills: 3 | Status: SHIPPED | OUTPATIENT
Start: 2020-04-27 | End: 2020-08-25

## 2020-05-14 ENCOUNTER — TELEPHONE (OUTPATIENT)
Dept: INFECTIOUS DISEASES | Age: 30
End: 2020-05-14

## 2020-05-15 ENCOUNTER — TELEPHONE (OUTPATIENT)
Dept: INFECTIOUS DISEASES | Age: 30
End: 2020-05-15

## 2020-05-15 RX ORDER — ACYCLOVIR 400 MG/1
400 TABLET ORAL 3 TIMES DAILY
Qty: 15 TABLET | Refills: 0 | Status: SHIPPED | OUTPATIENT
Start: 2020-05-15 | End: 2020-05-20

## 2020-05-15 NOTE — TELEPHONE ENCOUNTER
Spoke to pt he has blisters over the lower lip not sure if HSV low grade fevers now improving advised to up load picture on my chart- will send oral Acyclovir 400 mg x q8 hrs x 5 days  to his pharmacy for trial

## 2020-06-10 ENCOUNTER — OFFICE VISIT (OUTPATIENT)
Dept: PULMONOLOGY | Age: 30
End: 2020-06-10
Payer: COMMERCIAL

## 2020-06-10 VITALS
SYSTOLIC BLOOD PRESSURE: 135 MMHG | DIASTOLIC BLOOD PRESSURE: 74 MMHG | TEMPERATURE: 97.7 F | OXYGEN SATURATION: 99 % | BODY MASS INDEX: 22.46 KG/M2 | HEART RATE: 76 BPM | RESPIRATION RATE: 16 BRPM | HEIGHT: 74 IN | WEIGHT: 175 LBS

## 2020-06-10 DIAGNOSIS — R93.89 ABNORMAL CT OF THE CHEST: ICD-10-CM

## 2020-06-10 LAB
A/G RATIO: 2.1 (ref 1.1–2.2)
ALBUMIN SERPL-MCNC: 4.8 G/DL (ref 3.4–5)
ALP BLD-CCNC: 83 U/L (ref 40–129)
ALT SERPL-CCNC: 27 U/L (ref 10–40)
ANION GAP SERPL CALCULATED.3IONS-SCNC: 13 MMOL/L (ref 3–16)
AST SERPL-CCNC: 23 U/L (ref 15–37)
BILIRUB SERPL-MCNC: 0.4 MG/DL (ref 0–1)
BUN BLDV-MCNC: 22 MG/DL (ref 7–20)
CALCIUM SERPL-MCNC: 9.4 MG/DL (ref 8.3–10.6)
CHLORIDE BLD-SCNC: 102 MMOL/L (ref 99–110)
CO2: 24 MMOL/L (ref 21–32)
CREAT SERPL-MCNC: 0.7 MG/DL (ref 0.9–1.3)
GFR AFRICAN AMERICAN: >60
GFR NON-AFRICAN AMERICAN: >60
GLOBULIN: 2.3 G/DL
GLUCOSE BLD-MCNC: 118 MG/DL (ref 70–99)
POTASSIUM SERPL-SCNC: 4.3 MMOL/L (ref 3.5–5.1)
SODIUM BLD-SCNC: 139 MMOL/L (ref 136–145)
TOTAL PROTEIN: 7.1 G/DL (ref 6.4–8.2)

## 2020-06-10 PROCEDURE — 99213 OFFICE O/P EST LOW 20 MIN: CPT | Performed by: INTERNAL MEDICINE

## 2020-06-10 ASSESSMENT — ASTHMA QUESTIONNAIRES
QUESTION_4 LAST FOUR WEEKS HOW OFTEN HAVE YOU USED YOUR RESCUE INHALER OR NEBULIZER MEDICATION (SUCH AS ALBUTEROL): 5
QUESTION_1 LAST FOUR WEEKS HOW MUCH OF THE TIME DID YOUR ASTHMA KEEP YOU FROM GETTING AS MUCH DONE AT WORK, SCHOOL OR AT HOME: 3
QUESTION_2 LAST FOUR WEEKS HOW OFTEN HAVE YOU HAD SHORTNESS OF BREATH: 2
ACT_TOTALSCORE: 16
QUESTION_3 LAST FOUR WEEKS HOW OFTEN DID YOUR ASTHMA SYMPTOMS (WHEEZING, COUGHING, SHORTNESS OF BREATH, CHEST TIGHTNESS OR PAIN) WAKE YOU UP AT NIGHT OR EARLIER THAN USUAL IN THE MORNING: 2
QUESTION_5 LAST FOUR WEEKS HOW WOULD YOU RATE YOUR ASTHMA CONTROL: 4

## 2020-07-22 ENCOUNTER — TELEPHONE (OUTPATIENT)
Dept: INFECTIOUS DISEASES | Age: 30
End: 2020-07-22

## 2020-10-21 ENCOUNTER — VIRTUAL VISIT (OUTPATIENT)
Dept: INFECTIOUS DISEASES | Age: 30
End: 2020-10-21
Payer: COMMERCIAL

## 2020-10-21 PROCEDURE — 99213 OFFICE O/P EST LOW 20 MIN: CPT | Performed by: INTERNAL MEDICINE

## 2020-10-21 NOTE — PROGRESS NOTES
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 He has been on oral V fend therapy and now completed recently and no recent hospitalization no ED visits no fevers no chills has some iintermittent cough has to use PRN Albuterol some times for sob and wheeze no further hemoptysis. He over all doing much better than last visit. Past Medical History:    Past Medical History:   Diagnosis Date    Asthma     Lung abscess ([address]     PNA (pneumonia)     Pneumonia     Aspergillius fungus    Pneumonia 05/2018       Past Surgical History:    Past Surgical History:   Procedure Laterality Date    BRONCHOSCOPY  2018    BRONCHOSCOPY         Current Medications:    Current Outpatient Medications   Medication Sig Dispense Refill    albuterol sulfate HFA (PROAIR HFA) 108 (90 Base) MCG/ACT inhaler Inhale 2 puffs into the lungs every 4 hours as needed for Wheezing or Shortness of Breath 1 Inhaler 2     No current facility-administered medications for this visit.         Allergies:  Singulair [montelukast sodium]      Immunizations :   Immunization History   Administered Date(s) Administered    PPD Test 06/05/2018           Social History:    Social History     Socioeconomic History    Marital status:      Spouse name: Not on file    Number of children: Not on file    Years of education: Not on file    Highest education level: Not on file   Occupational History    Not on file   Social Needs    Financial resource strain: Not on file    Food insecurity     Worry: Not on file     Inability: Not on file    Transportation needs     Medical: Not on file     Non-medical: Not on file   Tobacco Use    Smoking status: Never Smoker    Smokeless tobacco: Never Used   Substance and Sexual Activity    Alcohol use: Yes     Comment: rarely    Drug use: No    Sexual activity: Yes     Partners: Female   Lifestyle    Physical activity     Days per week: Not on file     Minutes per session: Not on file    Stress: Not on file   Relationships    Social connections     Talks on phone: Not on file     Gets together: Not on file     Attends Pentecostal service: Not on file     Active member of club or organization: Not on file     Attends meetings of clubs or organizations: Not on file     Relationship status: Not on file    Intimate partner violence     Fear of current or ex partner: Not on file     Emotionally abused: Not on file     Physically abused: Not on file     Forced sexual activity: Not on file   Other Topics Concern    Not on file   Social History Narrative    Not on file     Social History     Tobacco Use   Smoking Status Never Smoker   Smokeless Tobacco Never Used      Family History : no DVT nO copd     REVIEW OF SYSTEMS:       CONSTITUTIONAL:  negative for fevers, chills, diaphoresis, activity change, appetite change, fatigue, night sweats and unexpected weight change.    EYES:  negative for blurred vision, eye discharge, visual disturbance and icterus  HEENT:  negative for hearing loss, tinnitus, ear drainage, sinus pressure, nasal congestion, epistaxis and snoring  RESPIRATORY:   Cough+ occasional sob+  or hemoptysis   CARDIOVASCULAR:  negative for chest pain, palpitations, exertional chest pressure/discomfort, edema, syncope  GASTROINTESTINAL:  negative for nausea, vomiting, diarrhea, constipation, blood in stool and abdominal pain  GENITOURINARY:  negative for frequency, dysuria, urinary incontinence, decreased urine volume, and hematuria  HEMATOLOGIC/LYMPHATIC:  negative for easy bruising, bleeding and lymphadenopathy  ALLERGIC/IMMUNOLOGIC:  negative for recurrent infections, angioedema, anaphylaxis and drug reactions  ENDOCRINE:  negative for weight changes and diabetic symptoms including polyuria, polydipsia and polyphagia  MUSCULOSKELETAL:  negative for  pain, joint swelling, decreased range of motion and muscle weakness  INTEGUMENTARY: negative for skin color change, rashes, blisters  NEUROLOGICAL:  negative for headaches, slurred speech, unilateral weakness  PSYCHIATRIC/BEHAVIORAL: negative for hallucinations, behavioral problems, confusion and agitation. PHYSICAL EXAM:    Vitals: There were no vitals filed for this visit. PHYSICAL EXAM:     In-person bedside physical examination deferred. Pursuant to the emergency declaration under the [address] [place] [address] and the [employer] Act, this clinical encounter was conducted to provide necessary medical care. (Also consistent with new provisions and guidance offered by [facility] on March 18, 2020 in setting of COVID 19 outbreak and in order to preserve personal protective equipment in accordance with the flexibilities announced by CMS on March 30, 2020)   References: https://[facility]/Portals/[email or web address]/Resources/COVID-19/3_18%20Telemed%20Guidance%20Updated%20March%2018. pdf?[email or web address]                      https://[facility]/Portals/[email or web address]/Resources/COVID-19/3_18%20Telemed%20Guidance%20Updated%20March%2018. pdf?bte=[email or web address]                      [email or web address] pdf    Abdomen/GI: deferred  Neuro: deferred  Skin: deferred  Musculoskeletal:  deferred  Genitourinary: Deferred  Psych: deferred  Lymphatic/Immunologic: deferred          DATA:    CBC:   Lab Results   Component Value Date WBC 9.9 03/11/2020    HGB 14.9 03/11/2020    HCT 45.0 03/11/2020    MCV 88.2 03/11/2020     03/11/2020     RENAL:   Lab Results   Component Value Date    CREATININE 0.7 (L) 06/10/2020    BUN 22 (H) 06/10/2020     06/10/2020    K 4.3 06/10/2020     06/10/2020    CO2 24 06/10/2020     SED RATE:   Lab Results   Component Value Date    SEDRATE 79 06/06/2018     CK: No results found for: CKTOTAL  CRP:   Lab Results   Component Value Date    CRP 54.6 06/06/2018     Hepatic Function Panel:   Lab Results   Component Value Date    ALKPHOS 83 06/10/2020    ALT 27 06/10/2020    AST 23 06/10/2020    PROT 7.1 06/10/2020    BILITOT 0.4 06/10/2020    BILIDIR <0.2 08/22/2018    IBILI see below 08/22/2018    LABALBU 4.8 06/10/2020     UA:  Lab Results   Component Value Date    COLORU YELLOW 04/26/2019    CLARITYU Clear 04/26/2019    GLUCOSEU Negative 04/26/2019    BILIRUBINUR Negative 04/26/2019    KETUA Negative 04/26/2019    SPECGRAV 1.009 04/26/2019    BLOODU Negative 04/26/2019    PHUR 7.5 04/26/2019    PROTEINU Negative 04/26/2019    UROBILINOGEN 0.2 04/26/2019    NITRU Negative 04/26/2019    LEUKOCYTESUR Negative 04/26/2019    LABMICR Not Indicated 04/26/2019    URINETYPE Not Specified 04/26/2019      Urine Microscopic: No results found for: LABCAST, BACTERIA, COMU, HYALCAST, WBCUA, RBCUA, EPIU  Urine Reflex to Culture:   Lab Results   Component Value Date    URRFLXCULT Not Indicated 04/26/2019       MICRO: cultures reviewed and updated by me   Vancomycin Random: No results found for: VANCORANDOM  Vancomycin Trough:   Lab Results   Component Value Date    VANCOTROUGH 12.6 06/06/2018     Wound Culture:   Lab Results   Component Value Date    ORG Haemophilus parainfluenzae 03/05/2020     Nasal Culture:   Lab Results   Component Value Date    ORG Haemophilus parainfluenzae 03/05/2020    MRSAPCR  06/05/2018     Negative - MRSA DNA not detected.   Normal Range: Not detected       Blood Culture:   Lab Results Negative     60-79 pg/mL . ......................... Indeterminate   2/17/2020  1:56 PM - Brianda Lozoya Incoming Lab Results From Soft (Epic Adt)     Component  Value  Ref Range & Units  Status  Collected  Lab    Aspergillus Ab,ID  None Detected  None Detected  Final  02/12/2020 12:52 PM  [facility]    INTERPRETIVE INFORMATION: Aspergillus spp. Antibodies by                             Immunodiffusion   In general, immunodiffusion measures IgG and a positive        Imaging:  No orders to display     CT chest :  1/25/20202     Impression    1.  No acute pulmonary thromboembolic disease.         2.  Mild left greater than right upper lobe ground-glass and small nodular    opacities are most suggestive of an infectious/inflammatory process.  No    pulmonary mass, consolidation or abscess.             Labs, Microbiology and  Radiology results pertinent to this visit and from [facility] every where  reviewed in detail by me as part of the consultation     IMPRESSION:     Diagnosis Orders   1. Aspergillus pneumonia ([address]     2. Abnormal CT of the chest     3. Hemoptysis       Suspected Fungal PNA - Aspergillus based on the previous cultures and he had positive Beta D glucan and rest of the work up was negative and he did have Bronchoscopy before and it was negative per out side records. His presentation was not clear but given the episodes of Hemoptysis he was placed on V fend therapy and has done well on this no further new resp symptoms,. He is using PRN albuterol    PLAN:    1. Complete V fend therapy as planned  2. Labs reviewed  3. Last chest imaging was from Jan 2020  4. He will call with any new issues or concerns for now follow up PRN       More than 50% of Face-to- Face time was spent in counseling and/or coordination of care including  reviewing data, discussing multiple problems and medications, formulating a plan.       Pursuant to the emergency declaration under the [address], [address] waiver

## 2020-11-03 PROBLEM — J18.9 PNEUMONIA DUE TO ORGANISM: Status: RESOLVED | Noted: 2020-11-03 | Resolved: 2020-11-03

## 2020-12-17 ENCOUNTER — OFFICE VISIT (OUTPATIENT)
Dept: PULMONOLOGY | Age: 30
End: 2020-12-17
Payer: COMMERCIAL

## 2020-12-17 VITALS
TEMPERATURE: 97.8 F | DIASTOLIC BLOOD PRESSURE: 62 MMHG | RESPIRATION RATE: 14 BRPM | HEART RATE: 86 BPM | HEIGHT: 74 IN | SYSTOLIC BLOOD PRESSURE: 116 MMHG | OXYGEN SATURATION: 97 % | WEIGHT: 181 LBS | BODY MASS INDEX: 23.23 KG/M2

## 2020-12-17 PROCEDURE — 99213 OFFICE O/P EST LOW 20 MIN: CPT | Performed by: INTERNAL MEDICINE

## 2020-12-17 RX ORDER — ALBUTEROL SULFATE 90 UG/1
2 AEROSOL, METERED RESPIRATORY (INHALATION) EVERY 4 HOURS PRN
Qty: 1 INHALER | Refills: 4 | Status: SHIPPED | OUTPATIENT
Start: 2020-12-17 | End: 2022-03-01

## 2020-12-17 ASSESSMENT — ASTHMA QUESTIONNAIRES
QUESTION_3 LAST FOUR WEEKS HOW OFTEN DID YOUR ASTHMA SYMPTOMS (WHEEZING, COUGHING, SHORTNESS OF BREATH, CHEST TIGHTNESS OR PAIN) WAKE YOU UP AT NIGHT OR EARLIER THAN USUAL IN THE MORNING: 2
QUESTION_2 LAST FOUR WEEKS HOW OFTEN HAVE YOU HAD SHORTNESS OF BREATH: 3
QUESTION_5 LAST FOUR WEEKS HOW WOULD YOU RATE YOUR ASTHMA CONTROL: 3
ACT_TOTALSCORE: 13
QUESTION_1 LAST FOUR WEEKS HOW MUCH OF THE TIME DID YOUR ASTHMA KEEP YOU FROM GETTING AS MUCH DONE AT WORK, SCHOOL OR AT HOME: 3
QUESTION_4 LAST FOUR WEEKS HOW OFTEN HAVE YOU USED YOUR RESCUE INHALER OR NEBULIZER MEDICATION (SUCH AS ALBUTEROL): 2

## 2020-12-17 NOTE — PROGRESS NOTES
Chief complaint  This is a 31y.o. year old male  who comes to see me with a chief complaint of   Chief Complaint   Patient presents with    Asthma     f/u Asthma       HPI  Here with a cc of asthma    He is doing ok. Using albuterol daily when needed. Does not feel limited due to breathing. Has some days where he is more tired and has chest tightness. He is not sick. He is off Vfend after having taken it for 6 months for aspergillus.   One episode of coughing up blood since being off of Vfeb back in August or so         Past Medical History:   Diagnosis Date    Asthma     Lung abscess ([address]     PNA (pneumonia)     Pneumonia     Aspergillius fungus    Pneumonia 05/2018       Past Surgical History:   Procedure Laterality Date    BRONCHOSCOPY  2018    BRONCHOSCOPY         Social History     Socioeconomic History    Marital status:      Spouse name: Not on file    Number of children: Not on file    Years of education: Not on file    Highest education level: Not on file   Occupational History    Not on file   Social Needs    Financial resource strain: Not on file    Food insecurity     Worry: Not on file     Inability: Not on file    Transportation needs     Medical: Not on file     Non-medical: Not on file   Tobacco Use    Smoking status: Never Smoker    Smokeless tobacco: Never Used   Substance and Sexual Activity    Alcohol use: Yes     Comment: rarely    Drug use: No    Sexual activity: Yes     Partners: Female   Lifestyle    Physical activity     Days per week: Not on file     Minutes per session: Not on file    Stress: Not on file   Relationships    Social connections     Talks on phone: Not on file     Gets together: Not on file     Attends [employer] service: Not on file     Active member of club or organization: Not on file     Attends meetings of clubs or organizations: Not on file     Relationship status: Not on file    Intimate partner violence     Fear of current or ex partner: Not on file     Emotionally abused: Not on file     Physically abused: Not on file     Forced sexual activity: Not on file   Other Topics Concern    Not on file   Social History Narrative    Not on file       History reviewed. No pertinent family history. Current Outpatient Medications:     albuterol sulfate HFA (PROAIR HFA) 108 (90 Base) MCG/ACT inhaler, Inhale 2 puffs into the lungs every 4 hours as needed for Wheezing or Shortness of Breath, Disp: 1 Inhaler, Rfl: 4    albuterol sulfate HFA (PROAIR HFA) 108 (90 Base) MCG/ACT inhaler, Inhale 2 puffs into the lungs every 4 hours as needed for Wheezing or Shortness of Breath, Disp: 1 Inhaler, Rfl: 2        PHYSICAL EXAM:  GENERAL:  Well nourished, alert, appears stated age, no distress  HEENT:  No scleral icterus, no conjunctival irritation  NECK:  No thyromegaly, no bruits  LYMPH:  No cervical or supraclavicular adenopathy  HEART:  Regular rate and rhythm, no murmurs  LUNGS:  Clear bilaterally   ABDOMEN:  No distention, no organomegaly  EXTREMITIES:  No edema, no digital clubbing  NEURO:  No localizing deficits, CN II-XII intact    Pulmonary Function Testing  None    Chest imaging from 1/2020 is reviewed and compared to 4/2019. My impression was that there was new ground glass changes in the ZOYA that were not present previously. Nodular opacities as well      ECHO  None  Lab Results   Component Value Date    WBC 9.9 03/11/2020    HGB 14.9 03/11/2020    HCT 45.0 03/11/2020    MCV 88.2 03/11/2020     03/11/2020       No results found for: BNP    Lab Results   Component Value Date    CREATININE 0.7 (L) 06/10/2020    BUN 22 (H) 06/10/2020     06/10/2020    K 4.3 06/10/2020     06/10/2020    CO2 24 06/10/2020     ASTHMA CONTROL TEST 12/17/2020 6/10/2020   In the past 4 weeks, how much of the time did your asthma keep you from getting as much done at work, school or at home?  3 3   During the past 4 weeks, how often have you had shortness of breath? 3 2   During the past 4 weeks, how often did your asthma symptoms (wheezing, coughing, shortness of breath, chest tightness or pain) wake you up at night or earlier than usual in the morning? 2 2   During the past 4 weeks, how often have you used your rescue inhaler or nebulizer medication (such as albuterol)? 2 5   How would you rate your asthma control during the past 4 weeks? 3 4   Asthma Control Test Total Score 13 16       Assessment/Plan:  1. Mild intermittent asthma without complication  Says it seems to be controlled and is able to do everything he needs to do. Continue with albuterol as needed. We talked about maintenance inhalers and even nebulizer machine. He is ok with current use of albuterol prn   - albuterol sulfate HFA (PROAIR HFA) 108 (90 Base) MCG/ACT inhaler; Inhale 2 puffs into the lungs every 4 hours as needed for Wheezing or Shortness of Breath  Dispense: 1 Inhaler; Refill: 4      Treated with Vfend for 6 months due to previous aspergillus pneumonia. 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   Did not have classic findings of ABPA on his CT at any point     Follow up in 6 months

## 2021-05-03 ENCOUNTER — TELEPHONE (OUTPATIENT)
Dept: PULMONOLOGY | Age: 31
End: 2021-05-03

## 2021-05-03 NOTE — TELEPHONE ENCOUNTER
Judi Acosta called stating his PCP dx him with Bronchitis on Friday after a CXR,  and prescribed him doxycycline and prednisone. Daphne Pierce he is still a little SOB with exertion. Told to continue the medications as prescribed and if he does not notice an improvement in a day or 2 to call the office  Instructed if he becomes severely SOB to go to the ED.

## 2021-05-28 ENCOUNTER — TELEPHONE (OUTPATIENT)
Dept: INFECTIOUS DISEASES | Age: 31
End: 2021-05-28

## 2021-06-03 ENCOUNTER — OFFICE VISIT (OUTPATIENT)
Dept: PULMONOLOGY | Age: 31
End: 2021-06-03
Payer: COMMERCIAL

## 2021-06-03 VITALS
WEIGHT: 184 LBS | BODY MASS INDEX: 23.61 KG/M2 | SYSTOLIC BLOOD PRESSURE: 128 MMHG | OXYGEN SATURATION: 97 % | TEMPERATURE: 97.7 F | RESPIRATION RATE: 16 BRPM | HEART RATE: 79 BPM | HEIGHT: 74 IN | DIASTOLIC BLOOD PRESSURE: 62 MMHG

## 2021-06-03 DIAGNOSIS — J45.20 MILD INTERMITTENT ASTHMA WITHOUT COMPLICATION: ICD-10-CM

## 2021-06-03 DIAGNOSIS — R04.2 HEMOPTYSIS: Primary | ICD-10-CM

## 2021-06-03 PROCEDURE — 99214 OFFICE O/P EST MOD 30 MIN: CPT | Performed by: INTERNAL MEDICINE

## 2021-06-03 ASSESSMENT — ASTHMA QUESTIONNAIRES
ACT_TOTALSCORE: 12
QUESTION_3 LAST FOUR WEEKS HOW OFTEN DID YOUR ASTHMA SYMPTOMS (WHEEZING, COUGHING, SHORTNESS OF BREATH, CHEST TIGHTNESS OR PAIN) WAKE YOU UP AT NIGHT OR EARLIER THAN USUAL IN THE MORNING: 2
QUESTION_5 LAST FOUR WEEKS HOW WOULD YOU RATE YOUR ASTHMA CONTROL: 3
QUESTION_4 LAST FOUR WEEKS HOW OFTEN HAVE YOU USED YOUR RESCUE INHALER OR NEBULIZER MEDICATION (SUCH AS ALBUTEROL): 2
QUESTION_1 LAST FOUR WEEKS HOW MUCH OF THE TIME DID YOUR ASTHMA KEEP YOU FROM GETTING AS MUCH DONE AT WORK, SCHOOL OR AT HOME: 3
QUESTION_2 LAST FOUR WEEKS HOW OFTEN HAVE YOU HAD SHORTNESS OF BREATH: 2

## 2021-06-03 NOTE — PROGRESS NOTES
Chief complaint  This is a 31y.o. year old male  who comes to see me with a chief complaint of   Chief Complaint   Patient presents with    Asthma       HPI  Here with a cc of asthma    Not doing well for the past 2 months. Coughing up bloods and failed antibiotics and prednisone. He has not been in the hospital.  Previously has been treated for aspergillus infection. 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 In addition he has been having weight loss, fatigue and poor work tolerance. He did notice some swelling or a lump under his left arm as well. Current Outpatient Medications:     albuterol sulfate HFA (PROAIR HFA) 108 (90 Base) MCG/ACT inhaler, Inhale 2 puffs into the lungs every 4 hours as needed for Wheezing or Shortness of Breath, Disp: 1 Inhaler, Rfl: 4    albuterol sulfate HFA (PROAIR HFA) 108 (90 Base) MCG/ACT inhaler, Inhale 2 puffs into the lungs every 4 hours as needed for Wheezing or Shortness of Breath, Disp: 1 Inhaler, Rfl: 2        PHYSICAL EXAM:  GENERAL:  Well nourished, alert, appears stated age, no distress, flat affect   HEENT:  No scleral icterus, no conjunctival irritation  NECK:  No thyromegaly, no bruits  LYMPH:  No cervical or supraclavicular adenopathy. No adenopathy noted in the left axillary region   HEART:  Regular rate and rhythm, no murmurs  LUNGS:  Diminished   ABDOMEN:  No distention, no organomegaly  EXTREMITIES:  No edema, no digital clubbing  NEURO:  No localizing deficits, CN II-XII intact    Pulmonary Function Testing  None    Chest imaging from 1/2020 is reviewed and compared to 4/2019. My impression was that there was new ground glass changes in the ZOYA that were not present previously.   Nodular opacities as well      ECHO  None  Lab Results   Component Value Date    WBC 9.9 03/11/2020    HGB 14.9 03/11/2020    HCT 45.0 03/11/2020    MCV 88.2 03/11/2020     03/11/2020       ASTHMA CONTROL TEST 6/3/2021 12/17/2020 6/10/2020   In the past 4 weeks, how much of the time did your asthma keep you from getting as much done at work, school or at home? 3 3 3   During the past 4 weeks, how often have you had shortness of breath? 2 3 2   During the past 4 weeks, how often did your asthma symptoms (wheezing, coughing, shortness of breath, chest tightness or pain) wake you up at night or earlier than usual in the morning? 2 2 2   During the past 4 weeks, how often have you used your rescue inhaler or nebulizer medication (such as albuterol)? 2 2 5   How would you rate your asthma control during the past 4 weeks? 3 3 4   Asthma Control Test Total Score 12 13 16       Assessment/Plan:  1. Hemoptysis  Most likely infection. Whether is aspergillus or not remains to be seen. Will get sputum culture and CT. Will plan for bronchoscopy once I have a chance to review the CT as it will serve as the road map   - Culture, Respiratory; Future  - CT CHEST WO CONTRAST; Future    2. Mild intermittent asthma without complication  Albuterol PRN    Has appt with Pierre next week.   May need an answer before we can treat him      Follow up pending

## 2021-06-09 ENCOUNTER — HOSPITAL ENCOUNTER (OUTPATIENT)
Dept: CT IMAGING | Age: 31
Discharge: HOME | End: 2021-06-09
Payer: COMMERCIAL

## 2021-06-09 ENCOUNTER — TELEPHONE (OUTPATIENT)
Dept: PULMONOLOGY | Age: 31
End: 2021-06-09

## 2021-06-09 DIAGNOSIS — R04.2 HEMOPTYSIS: ICD-10-CM

## 2021-06-09 PROCEDURE — 71250 CT THORAX DX C-: CPT

## 2021-06-09 NOTE — TELEPHONE ENCOUNTER
Laura Ahn calls back regarding CT of chest results. Per Laura Ahn he is available to have a bronch this Friday, June 11 if time is available. When ask, pt stated he does not take blood thinners or asa. Courtney will call scheduling to set up date and time and inform Dr. Niesha Arthur and Laura Ahn.

## 2021-06-09 NOTE — PROGRESS NOTES
[address] time_0630___________        Surgery time____0730________    Take the following medications with a sip of water: Follow your MD/Surgeons pre-procedure instructions regarding your medications    Do not eat or drink anything after 12:00 midnight prior to your surgery. This includes water chewing gum, mints and ice chips. You may brush your teeth and gargle the morning of your surgery, but do not swallow the water     Please see your family doctor/pediatrician for a history and physical and/or concerning medications. Bring any test results/reports from your physicians office. If you are under the care of a heart doctor or specialist doctor, please be aware that you may be asked to them for clearance    You may be asked to stop blood thinners such as Coumadin, Plavix, Fragmin, Lovenox, etc., or any anti-inflammatories such as:  Aspirin, Ibuprofen, Advil, Naproxen prior to your surgery. We also ask that you stop any OTC medications such as fish oil, vitamin E, glucosamine, garlic, Multivitamins, COQ 10, etc. May take tylenol    We ask that you do not smoke 24 hours prior to surgery  We ask that you do not  drink any alcoholic beverages 24 hours prior to surgery     You must make arrangements for a responsible adult to take you home after your surgery. For your safety you will not be allowed to leave alone or drive yourself home. Your surgery will be cancelled if you do not have a ride home. Also for your safety, it is strongly suggested that someone stay with you the first 24 hours after your surgery. A parent or legal guardian must accompany a child scheduled for surgery and plan to stay at the hospital until the child is discharged. Please do not bring other children with you. For your comfort, please wear simple loose fitting clothing to the hospital.  Please do not bring valuables.     Do not wear any make-up or nail polish on your fingers or toes      For your safety, please do not wear any jewelry or body piercing's on the day of surgery. All jewelry must be removed. If you have dentures, they will be removed before going to operating room. For your convenience, we will provide you with a container. If you wear contact lenses or glasses, they will be removed, please bring a case for them. If you have a living will and a durable power of  for healthcare, please bring in a copy. As part of our patient safety program to minimize surgical site infections, we ask you to do the following:    · Please notify your surgeon if you develop any illness between         now and the  day of your surgery. · This includes a cough, cold, fever, sore throat, nausea,         or vomiting, and diarrhea, etc.  ·  Please notify your surgeon if you experience dizziness, shortness         of breath or blurred vision between now and the time of your surgery. Do not shave your operative site 96 hours prior to surgery. For face and neck surgery, men may use an electric razor 48 hours   prior to surgery. You may shower the night before surgery or the morning of   your surgery with an antibacterial soap. You will need to bring a photo ID and insurance card    [facility] has an onsite pharmacy, would you like to utilize our pharmacy     If you will be staying overnight and use a C-pap machine, please bring   your C-pap to hospital     Our goal is to provide you with excellent care, therefore, visitors will be limited to two(2) in the room at a time so that we may focus on providing this care for you. Please contact pre-admission testing if you have any further questions. [facility] phone number:  [address] PAT fax number:  [phone]  Please note these are generalized instructions for all surgical cases, you may be provided with more specific instructions according to your surgery.     SAFETY FIRST. .call before you fall

## 2021-06-09 NOTE — PROGRESS NOTES
Preoperative Screening for Elective Surgery/Invasive Procedures While COVID-19 present in the community     Have you tested positive or have been told to self-isolate for COVID-19 like symptoms within the past 28 days? noDo you currently have any of the following symptoms? no  o Fever >100.0 F or 99.9 F in immunocompromised patients? no  o New onset cough, shortness of breath or difficulty breathing? no  o New onset sore throat, myalgia (muscle aches and pains), headache, loss of taste/smell or diarrhea? no   Have you had a potential exposure to COVID-19 within the past 14 days by: no  o Close contact with a confirmed case? no  o Close contact with a healthcare worker,  or essential infrastructure worker (grocery store, [employer], gas station, public utilities or transportation)? yes  o Do you reside in a congregate setting such as; skilled nursing facility, adult home, correctional facility, homeless shelter or other institutional setting? no  o Have you had recent travel to a known COVID-19 hotspot? no    Indicate if the patient has a positive screen by answering yes to one or more of the above questions. Patients who test positive or screen positive prior to surgery or on the day of surgery should be evaluated in conjunction with the surgeon/proceduralist/anesthesiologist to determine the urgency of the procedure.

## 2021-06-11 ENCOUNTER — HOSPITAL ENCOUNTER (OUTPATIENT)
Age: 31
Setting detail: OUTPATIENT SURGERY
Discharge: HOME | End: 2021-06-11
Attending: INTERNAL MEDICINE | Admitting: INTERNAL MEDICINE
Payer: COMMERCIAL

## 2021-06-11 VITALS
BODY MASS INDEX: 23.03 KG/M2 | DIASTOLIC BLOOD PRESSURE: 78 MMHG | TEMPERATURE: 96.9 F | RESPIRATION RATE: 16 BRPM | SYSTOLIC BLOOD PRESSURE: 117 MMHG | HEART RATE: 70 BPM | OXYGEN SATURATION: 98 % | HEIGHT: 74 IN | WEIGHT: 179.45 LBS

## 2021-06-11 DIAGNOSIS — R04.2 HEMOPTYSIS: ICD-10-CM

## 2021-06-11 LAB
APPEARANCE BAL (LAVAGE): ABNORMAL
CLOT EVALUATION BAL: ABNORMAL
COLOR LAVAGE: COLORLESS
EOSIN: 1 %
LYMPHOCYTES, BAL: 19 % (ref 5–10)
MACROPHAGES, BAL: 22 % (ref 90–95)
NUMBER OF CELLS COUNTED BAL (LAVAGE): 100
RBC, BAL: 173 /CUMM
SEGMENTED NEUTROPHILS, BAL: 58 % (ref 5–10)
VOLUME LAVAGE: 35 ML
WBC/EPI CELLS BAL: 270 /CUMM

## 2021-06-11 PROCEDURE — 6370000000 HC RX 637 (ALT 250 FOR IP): Performed by: INTERNAL MEDICINE

## 2021-06-11 PROCEDURE — 87015 SPECIMEN INFECT AGNT CONCNTJ: CPT

## 2021-06-11 PROCEDURE — 87116 MYCOBACTERIA CULTURE: CPT

## 2021-06-11 PROCEDURE — 89051 BODY FLUID CELL COUNT: CPT

## 2021-06-11 PROCEDURE — 2500000003 HC RX 250 WO HCPCS: Performed by: INTERNAL MEDICINE

## 2021-06-11 PROCEDURE — 7100000011 HC PHASE II RECOVERY - ADDTL 15 MIN: Performed by: INTERNAL MEDICINE

## 2021-06-11 PROCEDURE — 3609010800 HC BRONCHOSCOPY ALVEOLAR LAVAGE: Performed by: INTERNAL MEDICINE

## 2021-06-11 PROCEDURE — 87106 FUNGI IDENTIFICATION YEAST: CPT

## 2021-06-11 PROCEDURE — 99152 MOD SED SAME PHYS/QHP 5/>YRS: CPT | Performed by: INTERNAL MEDICINE

## 2021-06-11 PROCEDURE — 87205 SMEAR GRAM STAIN: CPT

## 2021-06-11 PROCEDURE — 87070 CULTURE OTHR SPECIMN AEROBIC: CPT

## 2021-06-11 PROCEDURE — 2709999900 HC NON-CHARGEABLE SUPPLY: Performed by: INTERNAL MEDICINE

## 2021-06-11 PROCEDURE — 87305 ASPERGILLUS AG IA: CPT

## 2021-06-11 PROCEDURE — 88305 TISSUE EXAM BY PATHOLOGIST: CPT

## 2021-06-11 PROCEDURE — 87252 VIRUS INOCULATION TISSUE: CPT

## 2021-06-11 PROCEDURE — 88312 SPECIAL STAINS GROUP 1: CPT

## 2021-06-11 PROCEDURE — 3609027000 HC BRONCHOSCOPY: Performed by: INTERNAL MEDICINE

## 2021-06-11 PROCEDURE — 7100000010 HC PHASE II RECOVERY - FIRST 15 MIN: Performed by: INTERNAL MEDICINE

## 2021-06-11 PROCEDURE — 87186 SC STD MICRODIL/AGAR DIL: CPT

## 2021-06-11 PROCEDURE — 31624 DX BRONCHOSCOPE/LAVAGE: CPT | Performed by: INTERNAL MEDICINE

## 2021-06-11 PROCEDURE — 88112 CYTOPATH CELL ENHANCE TECH: CPT

## 2021-06-11 PROCEDURE — 87077 CULTURE AEROBIC IDENTIFY: CPT

## 2021-06-11 PROCEDURE — 87102 FUNGUS ISOLATION CULTURE: CPT

## 2021-06-11 PROCEDURE — 87206 SMEAR FLUORESCENT/ACID STAI: CPT

## 2021-06-11 PROCEDURE — 6360000002 HC RX W HCPCS: Performed by: INTERNAL MEDICINE

## 2021-06-11 RX ORDER — LIDOCAINE HYDROCHLORIDE 20 MG/ML
JELLY TOPICAL PRN
Status: DISCONTINUED | OUTPATIENT
Start: 2021-06-11 | End: 2021-06-11 | Stop reason: ALTCHOICE

## 2021-06-11 RX ORDER — FENTANYL CITRATE 50 UG/ML
INJECTION, SOLUTION INTRAMUSCULAR; INTRAVENOUS PRN
Status: DISCONTINUED | OUTPATIENT
Start: 2021-06-11 | End: 2021-06-11 | Stop reason: ALTCHOICE

## 2021-06-11 RX ORDER — LIDOCAINE HYDROCHLORIDE 40 MG/ML
INJECTION, SOLUTION RETROBULBAR; TOPICAL PRN
Status: DISCONTINUED | OUTPATIENT
Start: 2021-06-11 | End: 2021-06-11 | Stop reason: ALTCHOICE

## 2021-06-11 RX ORDER — LIDOCAINE HYDROCHLORIDE 20 MG/ML
INJECTION, SOLUTION EPIDURAL; INFILTRATION; INTRACAUDAL; PERINEURAL PRN
Status: DISCONTINUED | OUTPATIENT
Start: 2021-06-11 | End: 2021-06-11 | Stop reason: ALTCHOICE

## 2021-06-11 RX ORDER — MIDAZOLAM HYDROCHLORIDE 1 MG/ML
INJECTION INTRAMUSCULAR; INTRAVENOUS PRN
Status: DISCONTINUED | OUTPATIENT
Start: 2021-06-11 | End: 2021-06-11 | Stop reason: ALTCHOICE

## 2021-06-11 RX ORDER — SODIUM CHLORIDE 450 MG/100ML
INJECTION, SOLUTION INTRAVENOUS CONTINUOUS
Status: DISCONTINUED | OUTPATIENT
Start: 2021-06-11 | End: 2021-06-11 | Stop reason: HOSPADM

## 2021-06-11 ASSESSMENT — PAIN SCALES - GENERAL
PAINLEVEL_OUTOF10: 0
PAINLEVEL_OUTOF10: 0

## 2021-06-11 ASSESSMENT — PAIN - FUNCTIONAL ASSESSMENT: PAIN_FUNCTIONAL_ASSESSMENT: 0-10

## 2021-06-11 NOTE — PROGRESS NOTES
Pt more awake. Tolerates ice chips and sitting up in chair. Decreased light-headedness. Wife to assist pt to dress in chair. Call light within reach.

## 2021-06-11 NOTE — PROGRESS NOTES
Patient tolerated procedure well. No complaints of pain. Resting comfortably. VSS. Respirations easy.  Currently on RA at 97% Yes

## 2021-06-11 NOTE — PROGRESS NOTES
Pt groggy but taking ice chips well. Occasional non productive cough. No complaints except sore throat but ice chips are helping. Discharge instructions given to pt and wife. Both express an understanding of instructions. Pt wants to rest on stretcher a little longer.

## 2021-06-11 NOTE — PROCEDURES
BRONCHOSCOPY WITH BAL    Indications:  Hemoptysis, history of aspergillus   Consent:  Signed on chart  Pre-op diagnosis:  Pneumonia     Type of sedation used: Moderate Sedation  Sedation:  Fentanyl 25 mcg, Versed 2 mg  Anesthetic:  Lidocaine 9 mL    Physician/patient face-to-face sedation start time: 0732   Physician/patient face-to-face sedation stop time: 0742  Total moderate sedation time in minutes: 10 minutes    Patient was monitored continuously 1:1 throughout the entire procedure while sedation was administered    Mallampati Class:  III    ASA Class 2 - A patient with mild systemic disease      Narrative: The patient was anesthestized with 4 % viscous lidocaine. I entered the left nare. I instilled 2 ml of 2% lidocaine on the the vocal cords, and then intubated the patient. The vocal cords 9. I then anesthestized the entire bronchial tree with 9 mL of 2% lidocaine. Initially small patches of white mucus were noted in bilateral lung fields. Trap placed on scope and washings were obtained from the bilateral central airways. 9 ml obtained    The right upper lobe demonstrated normal anatomy. The middle lobe demonstrated normal anatomy. The right lower lobe demonstrated normal anatomy. The left upper lobe demonstrated normal anatomy. The left lower lobe demonstrated normal anatomy. I then wedged my scope in the posterior segment of the RUL to perform a BAL. I instilled 80 mL of normal saline. I suctioned 33 ml. The fluid was mucoid. I then removed my scope to check for bleeding. There was no evidence of bleeding. Estimated blood loss:  none  Specimens:  9 ml of washings, 33 ml of lavage  Post-op diagnosis:  Pending  Complications: None    Alexandra Ag DO  [facility] Pulmonology, Sleep and Critical Care. I have presented reasonable alternatives to the patient's proposed care, treatment, and services.  The discussion I have done encompassed risks, benefits, and side effects related to the alternatives and the risks related to not receiving the proposed care, treatment, and services

## 2021-06-12 DIAGNOSIS — B34.8: Primary | ICD-10-CM

## 2021-06-12 RX ORDER — LEVOFLOXACIN 750 MG/1
750 TABLET ORAL DAILY
Qty: 10 TABLET | Refills: 0 | Status: SHIPPED | OUTPATIENT
Start: 2021-06-12 | End: 2021-06-22

## 2021-06-12 NOTE — PROGRESS NOTES
Called answering service due to fevers and chest tightness. Cultures from BAL yesterday showed bacterial infection.   Levaquin sent in

## 2021-06-13 LAB
CULTURE, RESPIRATORY: ABNORMAL
CULTURE, RESPIRATORY: ABNORMAL
CULTURE, RESPIRATORY: NORMAL
GRAM STAIN RESULT: ABNORMAL
GRAM STAIN RESULT: NORMAL
ORGANISM: ABNORMAL

## 2021-06-15 LAB
ASPERGILLUS GALACTO AG: POSITIVE
ASPERGILLUS GALACTO INDEX: 0.76

## 2021-06-23 ENCOUNTER — OFFICE VISIT (OUTPATIENT)
Dept: INFECTIOUS DISEASES | Age: 31
End: 2021-06-23
Payer: COMMERCIAL

## 2021-06-23 DIAGNOSIS — B44.9 ASPERGILLUS PNEUMONIA (HCC): ICD-10-CM

## 2021-06-23 DIAGNOSIS — R04.2 HEMOPTYSIS: ICD-10-CM

## 2021-06-23 DIAGNOSIS — R93.89 ABNORMAL CT OF THE CHEST: ICD-10-CM

## 2021-06-23 DIAGNOSIS — B44.9 ASPERGILLUS PNEUMONIA (HCC): Primary | ICD-10-CM

## 2021-06-23 LAB
A/G RATIO: 1.9 (ref 1.1–2.2)
ALBUMIN SERPL-MCNC: 5 G/DL (ref 3.4–5)
ALP BLD-CCNC: 81 U/L (ref 40–129)
ALT SERPL-CCNC: 20 U/L (ref 10–40)
ANION GAP SERPL CALCULATED.3IONS-SCNC: 17 MMOL/L (ref 3–16)
AST SERPL-CCNC: 20 U/L (ref 15–37)
BASOPHILS ABSOLUTE: 0 K/UL (ref 0–0.2)
BASOPHILS RELATIVE PERCENT: 0.8 %
BILIRUB SERPL-MCNC: 0.5 MG/DL (ref 0–1)
BUN BLDV-MCNC: 16 MG/DL (ref 7–20)
CALCIUM SERPL-MCNC: 9.7 MG/DL (ref 8.3–10.6)
CHLORIDE BLD-SCNC: 101 MMOL/L (ref 99–110)
CO2: 25 MMOL/L (ref 21–32)
CREAT SERPL-MCNC: 0.7 MG/DL (ref 0.9–1.3)
EOSINOPHILS ABSOLUTE: 0.1 K/UL (ref 0–0.6)
EOSINOPHILS RELATIVE PERCENT: 1.9 %
FINAL REPORT: NORMAL
GFR AFRICAN AMERICAN: >60
GFR NON-AFRICAN AMERICAN: >60
GLOBULIN: 2.7 G/DL
GLUCOSE BLD-MCNC: 67 MG/DL (ref 70–99)
HCT VFR BLD CALC: 42.8 % (ref 40.5–52.5)
HEMOGLOBIN: 14.8 G/DL (ref 13.5–17.5)
LYMPHOCYTES ABSOLUTE: 1.5 K/UL (ref 1–5.1)
LYMPHOCYTES RELATIVE PERCENT: 24.2 %
MCH RBC QN AUTO: 30 PG (ref 26–34)
MCHC RBC AUTO-ENTMCNC: 34.5 G/DL (ref 31–36)
MCV RBC AUTO: 86.8 FL (ref 80–100)
MONOCYTES ABSOLUTE: 0.4 K/UL (ref 0–1.3)
MONOCYTES RELATIVE PERCENT: 6.7 %
NEUTROPHILS ABSOLUTE: 4 K/UL (ref 1.7–7.7)
NEUTROPHILS RELATIVE PERCENT: 66.4 %
PDW BLD-RTO: 13.4 % (ref 12.4–15.4)
PLATELET # BLD: 290 K/UL (ref 135–450)
PMV BLD AUTO: 8.4 FL (ref 5–10.5)
POTASSIUM SERPL-SCNC: 4.2 MMOL/L (ref 3.5–5.1)
PRELIMINARY: NORMAL
RBC # BLD: 4.93 M/UL (ref 4.2–5.9)
SODIUM BLD-SCNC: 143 MMOL/L (ref 136–145)
TOTAL PROTEIN: 7.7 G/DL (ref 6.4–8.2)
WBC # BLD: 6 K/UL (ref 4–11)

## 2021-06-23 PROCEDURE — 99215 OFFICE O/P EST HI 40 MIN: CPT | Performed by: INTERNAL MEDICINE

## 2021-06-23 RX ORDER — VORICONAZOLE 200 MG/1
200 TABLET, FILM COATED ORAL 2 TIMES DAILY
Qty: 180 TABLET | Refills: 3 | Status: SHIPPED | OUTPATIENT
Start: 2021-06-23 | End: 2021-09-21

## 2021-06-23 NOTE — PROGRESS NOTES
Infectious Diseases Outpatient Follow-up        Primary Care Physician:  Vin Staples MD       History Obtained From:   Patient, EPIC    CHIEF COMPLAINT / ID problem:    Chief Complaint   Patient presents with    Follow-up     F/U on aspergillus. has some SOB during activity. sometimes has tightness in chest. has cough at night when laying down. has been off VFend for a year now. HISTORY OF PRESENT ILLNESS / Interval history:  Here for follow up on recurrent Hemoptysis none recently since his visit with . He recently underwent Bronchoscopy from  due to above issue and BAL cx 6/11/21 abnormal with Positive Aspergillus Antigen on BAL and Fungus cx isolated Aspergillus Fumigatus and routine Bacterial cx positive for Hemophilus parainfluenzae for which he was prescribed Levofloxacin and finished the course. CT chest from 6/9/21 with Rt UL ground glass opacities but no cavitary lesions. Previously he was treated for Fungal PNA Aspergillus in the past as his serum Beta D glucan was positive and his BAL cx from 8/2018 positive for ASPERGILLUS at that time and his resp symptoms resolved and has been off V fend therapy from June 2020. Given the recurrence of symptoms and positive BAL cx for Aspergillus again he is here to discuss treatment options. He gets sob, and cough with scant hemoptysis with episodes and these seems to have improved and he uses PRN inhalers for Asthma. 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         Past Medical History:    Past Medical History:   Diagnosis Date    Asthma     Lung abscess ([address]     PNA (pneumonia)     Pneumonia     Aspergillius fungus    Pneumonia 05/2018       Past Surgical History:    Past Surgical History:   Procedure Laterality Date    BRONCHOSCOPY  2018    BRONCHOSCOPY      BRONCHOSCOPY N/A 6/11/2021    BRONCHOSCOPY WITH BRONCHIOLAR ALVEOLAR LAVAGE performed by Lázaro Tejeda DO at [address] [facility]  6/11/2021    BRONCHOSCOPY DIAGNOSTIC OR CELL [address] ONLY performed by Lázaro Tejeda DO at [place] [address] EXTRACTION         Current Medications:    Current Outpatient Medications   Medication Sig Dispense Refill    voriconazole (VFEND) 200 MG tablet Take 1 tablet by mouth 2 times daily 180 tablet 3    albuterol sulfate HFA (PROAIR HFA) 108 (90 Base) MCG/ACT inhaler Inhale 2 puffs into the lungs every 4 hours as needed for Wheezing or Shortness of Breath 1 Inhaler 4     No current facility-administered medications for this visit. Allergies:  Singulair [montelukast sodium], Tiotropium bromide monohydrate, and Vancomycin      Immunizations :   Immunization History   Administered Date(s) Administered    COVID-19, Pfizer, PF, 30mcg/0.3mL 04/15/2021, 05/13/2021    PPD Test 06/05/2018           Social History:    Social History     Socioeconomic History    Marital status:      Spouse name: Not on file    Number of children: Not on file    Years of education: Not on file    Highest education level: Not on file   Occupational History    Not on file   Tobacco Use    Smoking status: Never Smoker    Smokeless tobacco: Never Used   Vaping Use    Vaping Use: Never used   Substance and Sexual Activity    Alcohol use: Yes     Comment: rarely    Drug use: Never    Sexual activity: Yes     Partners: Female   Other Topics Concern    Not on file   Social History Narrative    Not on file     Social Determinants of Health     Financial Resource Strain:     Difficulty of Paying Living Expenses:    Food Insecurity:     Worried About Running Out of Food in the Last Year:     [address] in the Last Year:    Transportation Needs:     Lack of Transportation (Medical):      Lack of Transportation (Non-Medical):    Physical Activity:     Days of Exercise per Week:     Minutes of Exercise per Session:    Stress:     Feeling of Stress :    Social Connections:     Frequency of Communication with Friends and Family:     Frequency of Social Gatherings with Friends and Family:     Attends Zoroastrian Services:     Active Member of Clubs or Organizations:     Attends Club or Organization Meetings:     Marital Status:    Intimate Partner Violence:     Fear of Current or Ex-Partner:     Emotionally Abused:     Physically Abused:     Sexually Abused:      Social History     Tobacco Use   Smoking Status Never Smoker   Smokeless Tobacco Never Used      Family History   Problem Relation Age of Onset    No Known Problems Mother     No Known Problems Father     No Known Problems Sister     No Known Problems Brother     No Known Problems Sister           REVIEW OF SYSTEMS:      CONSTITUTIONAL:  negative for fevers, chills, diaphoresis, activity change, appetite change, fatigue, night sweats and unexpected weight change.    EYES:  negative for blurred vision, eye discharge, visual disturbance and icterus  HEENT:  negative for hearing loss, tinnitus, ear drainage, sinus pressure, nasal congestion, epistaxis and snoring  RESPIRATORY:   cough + , +  sob, no sputum production , no wheeze ,no hemoptysis   CARDIOVASCULAR:  negative for chest pain + , palpitations, exertional chest pressure/discomfort, edema, syncope  GASTROINTESTINAL:  negative for nausea, vomiting, diarrhea, constipation, blood in stool and abdominal pain  GENITOURINARY:  negative for frequency, dysuria, urinary incontinence, decreased urine volume, and hematuria  HEMATOLOGIC/LYMPHATIC:  negative for easy bruising, bleeding and lymphadenopathy  ALLERGIC/IMMUNOLOGIC:  negative for recurrent infections, angioedema, anaphylaxis and drug reactions  ENDOCRINE:  negative for weight changes and diabetic symptoms including polyuria, polydipsia and polyphagia  MUSCULOSKELETAL:  negative for  pain, joint swelling, decreased range of motion and muscle weakness  INTEGUMENTARY: negative for skin color change, rashes, blisters  NEUROLOGICAL:  negative for headaches, slurred speech, unilateral weakness  PSYCHIATRIC/BEHAVIORAL: negative for hallucinations, behavioral problems, confusion and agitation. PHYSICAL EXAM:    Vitals: There were no vitals filed for this visit. General Appearance: alert,in no acute distress, no pallor, no icterus   Skin: warm and dry, no rash or erythema  Head: normocephalic and atraumatic  Eyes: pupils equal, round, and reactive to light, extraocular eye movements intact, conjunctivae normal  ENT: tympanic membrane, external ear and ear canal normal bilaterally, nose without deformity, nasal mucosa and turbinates normal without polyps  Neck: supple and non-tender without mass, no thyromegaly  no cervical lymphadenopathy  Pulmonary/Chest: clear to auscultation bilaterally- no wheezes, rales or rhonchi, normal air movement,  Cardiovascular: normal rate, regular rhythm, normal S1 and S2, no murmurs, rubs, clicks, or gallops,   Abdomen: soft, non-tender, non-distended, normal bowel sounds, no masses or organomegaly  Extremities: no cyanosis, clubbing or edema  Musculoskeletal: normal range of motion, no joint swelling   Integumentary: no petechiae, no purpura, no blisters  Neurologic: reflexes normal and symmetric, no cranial nerve deficit, speech normal   Psych:  Orientation, sensorium, mood normal       DATA:    CBC:   Lab Results   Component Value Date    WBC 9.9 03/11/2020    HGB 14.9 03/11/2020    HCT 45.0 03/11/2020    MCV 88.2 03/11/2020     03/11/2020     RENAL:   Lab Results   Component Value Date    CREATININE 0.7 (L) 06/10/2020    BUN 22 (H) 06/10/2020     06/10/2020    K 4.3 06/10/2020     06/10/2020    CO2 24 06/10/2020     SED RATE:   Lab Results   Component Value Date    SEDRATE 79 06/06/2018     CK:  No results found for: CKTOTAL  CRP:   Lab Results   Component Value Date    CRP 54.6 06/06/2018     Hepatic Function Panel:   Lab Results   Component Value Date    ALKPHOS 83 06/10/2020    ALT 27 06/10/2020    AST 23 06/10/2020    PROT 7.1 06/10/2020    BILITOT 0.4 06/10/2020    BILIDIR <0.2 08/22/2018    IBILI see below 08/22/2018    LABALBU 4.8 06/10/2020     UA:  Lab Results   Component Value Date    COLORU YELLOW 04/26/2019    CLARITYU Clear 04/26/2019    GLUCOSEU Negative 04/26/2019    BILIRUBINUR Negative 04/26/2019    KETUA Negative 04/26/2019    SPECGRAV 1.009 04/26/2019    BLOODU Negative 04/26/2019    PHUR 7.5 04/26/2019    PROTEINU Negative 04/26/2019    UROBILINOGEN 0.2 04/26/2019    NITRU Negative 04/26/2019    LEUKOCYTESUR Negative 04/26/2019    LABMICR Not Indicated 04/26/2019    URINETYPE Not Specified 04/26/2019      Urine Microscopic: No results found for: LABCAST, BACTERIA, COMU, HYALCAST, WBCUA, RBCUA, EPIU  Urine Reflex to Culture:   Lab Results   Component Value Date    URRFLXCULT Not Indicated 04/26/2019       MICRO: cultures reviewed and updated by me     FINAL DIAGNOSIS:     A: Bronchial Washing, Bilateral:   -  No malignant cells identified   -  PAS-F stain is negative for fungal organisms     B: Bronchial Alveolar Lavage, Right Upper Lobe:   -  No malignant cells identified   -  PAS-F stain is negative for fungal organisms        BOBBYJA/WILLY       CLINICAL DIAGNOSIS:    post op dx: history of aspergillus pneumonia,   diary famer by profession     SPECIMEN:   A.  LUNG, BRONCH WASHING B/L LUNGS   B.  LUNG, BAL RUL     Specimen Information: BAL- Bronch. Lavage;  Respiratory        Component Collected Lab   Fungus Stain 06/11/2021  8:11 AM  - [facility]   No Fungal elements seen    Organism Abnormal  06/11/2021  8:11 AM [address] Lab   Aspergillus fumigatus    Fungus (Mycology) Culture 06/11/2021  8:11 AM  - [facility] for   No further workup    Testing Performed By    Lab - Abbreviation Name Director Address Valid Date Range   [facility] [address] [facility] Rafiq Prater M.D. [address] [facility] [address] 09/26/20 0000-Present   Narrative  Performed by: [facility]  ORDER#: [id number]                          ORDERED BY: BELEN PEREZ   SOURCE: Bronchial Alveolar Lavage          COLLECTED:  06/11/21 08:11   [facility] :                      RECEIVED :  06/11/21 10:58   Performed at:   [facility]   [address] [facility] [address]   Phone [phone]   Lab and Collection    /13/2021 11:24 AM - [facility] Incoming Lab Results From Soft (Epic Adt)    Specimen Information: BAL- Bronch. Lavage; Respiratory        Component Collected Lab   CULTURE, RESPIRATORY Abnormal  06/11/2021  8:11 AM [facility]   50,000-100,000 CFU/mL Normal respiratory constantino    Gram Stain Result 06/11/2021  8:11 AM [facility]   No Epithelial Cells seen   1+ WBC's (Polymorphonuclear)   No organisms seen    Organism Abnormal  06/11/2021  8:11 AM [facility]   Haemophilus parahaemolyticus    CULTURE, RESPIRATORY 06/11/2021  8:11 AM [facility]   50,000 to 100,000 CFU/mL   Beta Lactamase Negative   Sensitivities not routinely performed. Drugs of choice are:   Ampicillin, Ceftriaxone or Trimethoprim/Sulfamethoxazole. Testing Performed By    Lab - Abbreviation Name Director Address Valid Date Range   [address] [place] [address] LAB Rafiq Prater M.D. [address] [facility] [address] 09/26/20 0000-Present   Narrative  Performed by: [facility]  ORDER#: [id number]                          ORDERED BY: BELEN PEREZ   SOURCE: Bronchial Alveolar Lavage          COLLECTED:  06/11/21 08:11   [facility] :                      RECEIVED :  06/11/21 10:54   Performed at:        Respiratory Culture:  Lab Results   Component Value Date    CULTRESP 50,000-100,000 CFU/mL Normal respiratory constantino 06/11/2021    CULTRESP  06/11/2021     50,000 to 100,000 CFU/mL  Beta Lactamase Negative  Sensitivities not routinely performed. Drugs of choice are:  Ampicillin, Ceftriaxone or Trimethoprim/Sulfamethoxazole. LABGRAM  06/11/2021     No Epithelial Cells seen  1+ WBC's (Polymorphonuclear)  No organisms seen       AFB:  Lab Results   Component Value Date    AFBSMEAR No AFB observed by Fluorescent stain 06/11/2021       Urine Culture: No results for input(s): LABURIN in the last 72 hours. Imaging:  CT chest 6/9/21    FINDINGS:   Mediastinum: The unenhanced heart is unremarkable.  There are no enlarged   thoracic lymph nodes.  Calcified granulomatous disease is noted.       Lungs/pleura: The tracheobronchial tree is patent. Edna Contes is no pneumothorax   or pleural effusion. Edna Contes is biapical scarring.       There is complete resolution of the previously noted left upper lobe and   lingular bronchiolitis.  However, there is subtle right upper lobe   ground-glass opacities favoring infectious/inflammatory process.       Upper Abdomen: Images of the upper abdomen are unremarkable.       Soft Tissues/Bones: Degenerative changes involve the thoracic spine.           Impression   1. Subtle right upper lobe ground-glass opacities favoring   infectious/inflammatory process.           Labs, Microbiology and  Radiology results pertinent to this visit and from care every where  reviewed in detail by me as part of the consultation     IMPRESSION:     Diagnosis Orders   1. Aspergillus pneumonia (HCC)  1,3 Beta-D-Glucan    ASPERGILLUS GALACTOMANNAN AG ASSAY    COMPREHENSIVE METABOLIC PANEL    CBC Auto Differential   2. Abnormal CT of the chest     3. Hemoptysis       Suspect hemoptysis related to Aspergillus PNA and infection previously he was given a course of Oral V fend therapy based on his symptoms, CT findings and positive serology and BAL cx from  8/2018 was positive for Aspergillus.  He was seen in the Clinic for follow up in Feb 2020 and was placed another course of V fend therapy given the hemoptysis and he completed x 6 months course and was off therapy from Oct 2020, but unfortunately had recurrence of hemoptysis and CT chest from June 2021 no cavitary lesions but had some Rt UL changes and he underwent repeat Bronch on 6/13/21 and BAL cx now positive for Aspergillus fumigatus along with positive BAL Aspergillus antigen. PLAN:    1. Start oral V fend therapy x 200 mg x twice a day scripts sent    2. Check Lfts   3. Serum Beta D glucan, Aspergillus antigen   4. Will d/w Micro to see if they can check sensitivities on the Fungal isolate  5. CT chest from 6/9/21 reviewed  6. Follow up in 8 weeks  7. Will check Voriconazole level in follow up   8. Unfortunately given the recurrence of symptoms off therapy and positive BAL cx antifungal therapy will be long term             Total time spent for this encounter: 45 min  on visit (including interval history,physical exam, review of data including labs, cultures, imaging,  ordering labs, development and implementation of treatment plan, co ordination of care, counseling and education ). --Michael Agustin MD on 6/23/2021 at 11:28 AM    An electronic signature was used to authenticate this note. D/w Patient and his wife   in detail at  the time of consultation. Thanks for allowing me to participate in your patient's care and please do not hesitate to  call me with any questions or concerns.     Bill Samuels MD  Infectious Disease  [facility]) Physician  Phone: [phone]   Fax : [phone]

## 2021-06-24 ENCOUNTER — TELEPHONE (OUTPATIENT)
Dept: INFECTIOUS DISEASES | Age: 31
End: 2021-06-24

## 2021-06-24 NOTE — TELEPHONE ENCOUNTER
Processed PA for voriconazole. Key is [id number]. Other PA is being [facility] able to get [facility].  Will need to check back with them to check on status

## 2021-06-25 LAB
(1,3)-BETA-D-GLUCAN (FUNGITELL) INTERPRETATION: NEGATIVE
(1,3)-BETA-D-GLUCAN (FUNGITELL): <31 PG/ML
ASPERGILLUS GALACTO AG: NEGATIVE
ASPERGILLUS GALACTO INDEX: 0.06

## 2021-07-14 ENCOUNTER — TELEPHONE (OUTPATIENT)
Dept: INFECTIOUS DISEASES | Age: 31
End: 2021-07-14

## 2021-07-14 NOTE — TELEPHONE ENCOUNTER
Patient called stating that his lips are swollen, blistered and have sores. He thinks it could be from the V-Fend. He has been taking this medication for about two weeks and this started about a week ago.

## 2021-07-16 NOTE — TELEPHONE ENCOUNTER
Pt states he seen his PCP today. They fell as if he was having a reaction to the voriconazole. He seem to be getting better, so he said he will stay on the meds, but if he gets worse again he will stop.

## 2021-07-27 LAB
AFB CULTURE (MYCOBACTERIA): NORMAL
AFB CULTURE (MYCOBACTERIA): NORMAL
AFB SMEAR: NORMAL
AFB SMEAR: NORMAL
FUNGUS (MYCOLOGY) CULTURE: ABNORMAL
FUNGUS STAIN: ABNORMAL
FUNGUS STAIN: ABNORMAL
ORGANISM: ABNORMAL

## 2021-07-29 LAB
Lab: NORMAL
REPORT: NORMAL
THIS TEST SENT TO: NORMAL

## 2021-08-11 ENCOUNTER — OFFICE VISIT (OUTPATIENT)
Dept: INFECTIOUS DISEASES | Age: 31
End: 2021-08-11
Payer: COMMERCIAL

## 2021-08-11 VITALS — DIASTOLIC BLOOD PRESSURE: 64 MMHG | SYSTOLIC BLOOD PRESSURE: 112 MMHG

## 2021-08-11 DIAGNOSIS — B44.9 ASPERGILLUS PNEUMONIA (HCC): Primary | ICD-10-CM

## 2021-08-11 DIAGNOSIS — R93.89 ABNORMAL CT OF THE CHEST: ICD-10-CM

## 2021-08-11 DIAGNOSIS — R04.2 HEMOPTYSIS: ICD-10-CM

## 2021-08-11 PROCEDURE — 99214 OFFICE O/P EST MOD 30 MIN: CPT | Performed by: INTERNAL MEDICINE

## 2021-08-11 NOTE — PROGRESS NOTES
every 4 hours as needed for Wheezing or Shortness of Breath 1 Inhaler 4     No current facility-administered medications for this visit. Allergies:  Singulair [montelukast sodium], Tiotropium bromide monohydrate, and Vancomycin      Immunizations :   Immunization History   Administered Date(s) Administered    COVID-19, Gutiérrez Peter, PF, 30mcg/0.3mL 04/15/2021, 05/13/2021    PPD Test 06/05/2018           Social History:    Social History     Socioeconomic History    Marital status:      Spouse name: None    Number of children: None    Years of education: None    Highest education level: None   Occupational History    None   Tobacco Use    Smoking status: Never Smoker    Smokeless tobacco: Never Used   Vaping Use    Vaping Use: Never used   Substance and Sexual Activity    Alcohol use: Yes     Comment: rarely    Drug use: Never    Sexual activity: Yes     Partners: Female   Other Topics Concern    None   Social History Narrative    None     Social Determinants of Health     Financial Resource Strain:     Difficulty of Paying Living Expenses:    Food Insecurity:     Worried About Running Out of Food in the Last Year:     Ran Out of Food in the Last Year:    Transportation Needs:     Lack of Transportation (Medical):      Lack of Transportation (Non-Medical):    Physical Activity:     Days of Exercise per Week:     Minutes of Exercise per Session:    Stress:     Feeling of Stress :    Social Connections:     Frequency of Communication with Friends and Family:     Frequency of Social Gatherings with Friends and Family:     Attends Yarsanism Services:     Active Member of Clubs or Organizations:     Attends Club or Organization Meetings:     Marital Status:    Intimate Partner Violence:     Fear of Current or Ex-Partner:     Emotionally Abused:     Physically Abused:     Sexually Abused:      Social History     Tobacco Use   Smoking Status Never Smoker   Smokeless Tobacco Never Used Family History   Problem Relation Age of Onset    No Known Problems Mother     No Known Problems Father     No Known Problems Sister     No Known Problems Brother     No Known Problems Sister         REVIEW OF SYSTEMS:      CONSTITUTIONAL:  negative for fevers, chills, diaphoresis, activity change, appetite change, fatigue, night sweats and unexpected weight change. EYES:  negative for blurred vision, eye discharge, visual disturbance and icterus  HEENT:  negative for hearing loss, tinnitus, ear drainage, sinus pressure, nasal congestion, epistaxis and snoring  RESPIRATORY:   cough+  , no sob, no sputum production , no wheeze ,no hemoptysis   CARDIOVASCULAR:  negative for chest pain, palpitations, exertional chest pressure/discomfort, edema, syncope  GASTROINTESTINAL:  negative for nausea, vomiting, diarrhea, constipation, blood in stool and abdominal pain  GENITOURINARY:  negative for frequency, dysuria, urinary incontinence, decreased urine volume, and hematuria  HEMATOLOGIC/LYMPHATIC:  negative for easy bruising, bleeding and lymphadenopathy  ALLERGIC/IMMUNOLOGIC:  negative for recurrent infections, angioedema, anaphylaxis and drug reactions  ENDOCRINE:  negative for weight changes and diabetic symptoms including polyuria, polydipsia and polyphagia  MUSCULOSKELETAL:  negative for  pain, joint swelling, decreased range of motion and muscle weakness  INTEGUMENTARY: negative for skin color change, rashes, blisters  NEUROLOGICAL:  negative for headaches, slurred speech, unilateral weakness  PSYCHIATRIC/BEHAVIORAL: negative for hallucinations, behavioral problems, confusion and agitation.      PHYSICAL EXAM:    Vitals:    Vitals:    08/11/21 0854   BP: 112/64     General Appearance: alert,in no acute distress, no pallor, no icterus   Skin: warm and dry, no rash or erythema  Head: normocephalic and atraumatic  Eyes: pupils equal, round, and reactive to light, extraocular eye movements intact, conjunctivae normal  ENT: tympanic membrane, external ear and ear canal normal bilaterally, nose without deformity, nasal mucosa and turbinates normal without polyps  Neck: supple and non-tender without mass, no thyromegaly  no cervical lymphadenopathy  Pulmonary/Chest: clear to auscultation bilaterally- no wheezes, rales or rhonchi, normal air movement,  Cardiovascular: normal rate, regular rhythm, normal S1 and S2, no murmurs, rubs, clicks, or gallops,   Abdomen: soft, non-tender, non-distended, normal bowel sounds, no masses or organomegaly  Extremities: no cyanosis, clubbing or edema  Musculoskeletal: normal range of motion, no joint swelling   Integumentary: no petechiae, no purpura, no blisters  Neurologic: reflexes normal and symmetric, no cranial nerve deficit, speech normal   Psych:  Orientation, sensorium, mood normal       DATA:    CBC:   Lab Results   Component Value Date    WBC 6.0 06/23/2021    HGB 14.8 06/23/2021    HCT 42.8 06/23/2021    MCV 86.8 06/23/2021     06/23/2021     RENAL:   Lab Results   Component Value Date    CREATININE 0.7 (L) 06/23/2021    BUN 16 06/23/2021     06/23/2021    K 4.2 06/23/2021     06/23/2021    CO2 25 06/23/2021     SED RATE:   Lab Results   Component Value Date    SEDRATE 79 06/06/2018     CK:  No results found for: CKTOTAL  CRP:   Lab Results   Component Value Date    CRP 54.6 06/06/2018     Hepatic Function Panel:   Lab Results   Component Value Date    ALKPHOS 88 08/11/2021    ALT 22 08/11/2021    AST 20 08/11/2021    PROT 7.7 08/11/2021    BILITOT 0.3 08/11/2021    BILIDIR <0.2 08/11/2021    IBILI see below 08/11/2021    LABALBU 4.8 08/11/2021     UA:  Lab Results   Component Value Date    COLORU YELLOW 04/26/2019    CLARITYU Clear 04/26/2019    GLUCOSEU Negative 04/26/2019    BILIRUBINUR Negative 04/26/2019    KETUA Negative 04/26/2019    SPECGRAV 1.009 04/26/2019    BLOODU Negative 04/26/2019    PHUR 7.5 04/26/2019    PROTEINU Negative 04/26/2019 UROBILINOGEN 0.2 04/26/2019    NITRU Negative 04/26/2019    LEUKOCYTESUR Negative 04/26/2019    LABMICR Not Indicated 04/26/2019    URINETYPE Not Specified 04/26/2019      Urine Microscopic: No results found for: LABCAST, BACTERIA, COMU, HYALCAST, WBCUA, RBCUA, EPIU  Urine Reflex to Culture:   Lab Results   Component Value Date    URRFLXCULT Not Indicated 04/26/2019       MICRO: cultures reviewed and updated by me        Component Collected Lab   Fungus Stain 06/11/2021  7:52 AM [facility]   No Fungal elements seen    Organism Abnormal  06/11/2021  7:52 AM [facility]   Rachel famata    Fungus (Mycology) Culture 06/11/2021  7:52 AM [facility]   Rare growth   No further workup    Organism Abnormal  06/11/2021  7:52 AM [facility]   Aspergillus fumigatus    Fungus (Mycology) Culture 06/11/2021  7:52 AM [facility] [address] for   Sensitivity completed by Elif Cifuentes   See Reference Report for sensitivity    Organism Abnormal  06/11/2021  7:52 AM [facility]   Penicillium species    Fungus (Mycology) Culture 06/11/2021  7:52 AM [facility] [address] for   No further workup    Testing Performed By    Lab - Abbreviation Name Director Address Valid Date Range   48- - [facility] Melissa Lugo M.D. [address] [place] [address]. [place] [address] 09/26/20 0000-Present       Respiratory Culture:  Lab Results   Component Value Date    CULTRESP 50,000-100,000 CFU/mL Normal respiratory constantino 06/11/2021    CULTRESP  06/11/2021     50,000 to 100,000 CFU/mL  Beta Lactamase Negative  Sensitivities not routinely performed. Drugs of choice are:  Ampicillin, Ceftriaxone or Trimethoprim/Sulfamethoxazole.       LABGRAM  06/11/2021     No Epithelial Cells seen  1+ WBC's (Polymorphonuclear)  No organisms seen       AFB:  Lab Results   Component Value Date    AFBSMEAR No AFB observed by Fluorescent stain 06/11/2021       Urine Culture: No results for input(s): LABURIN in the last 72 hours. Imaging:  No orders to display       CT chest  : 6/9/21   FINDINGS:   Mediastinum: The unenhanced heart is unremarkable.  There are no enlarged   thoracic lymph nodes.  Calcified granulomatous disease is noted.       Lungs/pleura: The tracheobronchial tree is patent. Suann Pillar is no pneumothorax   or pleural effusion. Suann Pillar is biapical scarring.       There is complete resolution of the previously noted left upper lobe and   lingular bronchiolitis.  However, there is subtle right upper lobe   ground-glass opacities favoring infectious/inflammatory process.       Upper Abdomen: Images of the upper abdomen are unremarkable.       Soft Tissues/Bones: Degenerative changes involve the thoracic spine.           Impression   1. Subtle right upper lobe ground-glass opacities favoring   infectious/inflammatory process. Labs, Microbiology and  Radiology results pertinent to this visit and from care every where  reviewed in detail by me as part of the consultation     IMPRESSION:     Diagnosis Orders   1. Aspergillus pneumonia ([address]  HEPATIC FUNCTION PANEL   2. Abnormal CT of the chest     3. 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 He is now on ORAL V fend therapy and he had some rash but now resolved an no new lesions. His LfS NORMAL  And will have to cont oral V fend   therapy as before. PLAN:   1. Cont oral V Fend therapy   2. CBC with diff, CMP , esr, crp  3. Anticipate  X 6 months of therapy   4. .Will do follow up CT chest if necessary   5. Follow up x 4 months       Total time spent for this encounter: 32   on visit (including interval history,physical exam, review of data including labs, cultures, imaging,  ordering labs, development and implementation of treatment plan, co ordination of care, counseling and education ). --Anamika Cunningham MD on 8/29/2021 at 12:21 AM    An electronic signature was used to authenticate this note. D/w Patient in detail at  the time of consultation. Thanks for allowing me to participate in your patient's care and please do not hesitate to  call me with any questions or concerns.     Dominic Richard MD  Infectious Disease  [facility]) Physician  Phone: [phone]   Fax : [phone]

## 2021-12-08 ENCOUNTER — OFFICE VISIT (OUTPATIENT)
Dept: INFECTIOUS DISEASES | Age: 31
End: 2021-12-08
Payer: COMMERCIAL

## 2021-12-08 VITALS
HEART RATE: 81 BPM | WEIGHT: 176.8 LBS | BODY MASS INDEX: 22.69 KG/M2 | DIASTOLIC BLOOD PRESSURE: 72 MMHG | HEIGHT: 74 IN | SYSTOLIC BLOOD PRESSURE: 132 MMHG | OXYGEN SATURATION: 99 %

## 2021-12-08 DIAGNOSIS — B44.9 ASPERGILLUS PNEUMONIA (HCC): ICD-10-CM

## 2021-12-08 DIAGNOSIS — B44.9 ASPERGILLUS PNEUMONIA (HCC): Primary | ICD-10-CM

## 2021-12-08 DIAGNOSIS — R93.89 ABNORMAL CT OF THE CHEST: ICD-10-CM

## 2021-12-08 DIAGNOSIS — R04.2 HEMOPTYSIS: ICD-10-CM

## 2021-12-08 LAB
A/G RATIO: 2 (ref 1.1–2.2)
ALBUMIN SERPL-MCNC: 5.4 G/DL (ref 3.4–5)
ALP BLD-CCNC: 98 U/L (ref 40–129)
ALT SERPL-CCNC: 25 U/L (ref 10–40)
ANION GAP SERPL CALCULATED.3IONS-SCNC: 14 MMOL/L (ref 3–16)
AST SERPL-CCNC: 28 U/L (ref 15–37)
BASOPHILS ABSOLUTE: 0 K/UL (ref 0–0.2)
BASOPHILS RELATIVE PERCENT: 0.7 %
BILIRUB SERPL-MCNC: 0.4 MG/DL (ref 0–1)
BUN BLDV-MCNC: 20 MG/DL (ref 7–20)
CALCIUM SERPL-MCNC: 9.8 MG/DL (ref 8.3–10.6)
CHLORIDE BLD-SCNC: 101 MMOL/L (ref 99–110)
CO2: 24 MMOL/L (ref 21–32)
CREAT SERPL-MCNC: 0.8 MG/DL (ref 0.9–1.3)
EOSINOPHILS ABSOLUTE: 0.1 K/UL (ref 0–0.6)
EOSINOPHILS RELATIVE PERCENT: 1.6 %
GFR AFRICAN AMERICAN: >60
GFR NON-AFRICAN AMERICAN: >60
GLUCOSE BLD-MCNC: 80 MG/DL (ref 70–99)
HCT VFR BLD CALC: 44.8 % (ref 40.5–52.5)
HEMOGLOBIN: 15.1 G/DL (ref 13.5–17.5)
LYMPHOCYTES ABSOLUTE: 1.4 K/UL (ref 1–5.1)
LYMPHOCYTES RELATIVE PERCENT: 26.5 %
MCH RBC QN AUTO: 29.4 PG (ref 26–34)
MCHC RBC AUTO-ENTMCNC: 33.8 G/DL (ref 31–36)
MCV RBC AUTO: 87 FL (ref 80–100)
MONOCYTES ABSOLUTE: 0.4 K/UL (ref 0–1.3)
MONOCYTES RELATIVE PERCENT: 8.2 %
NEUTROPHILS ABSOLUTE: 3.4 K/UL (ref 1.7–7.7)
NEUTROPHILS RELATIVE PERCENT: 63 %
PDW BLD-RTO: 12.8 % (ref 12.4–15.4)
PLATELET # BLD: 300 K/UL (ref 135–450)
PMV BLD AUTO: 8.5 FL (ref 5–10.5)
POTASSIUM SERPL-SCNC: 3.8 MMOL/L (ref 3.5–5.1)
RBC # BLD: 5.15 M/UL (ref 4.2–5.9)
SODIUM BLD-SCNC: 139 MMOL/L (ref 136–145)
TOTAL PROTEIN: 8.1 G/DL (ref 6.4–8.2)
WBC # BLD: 5.5 K/UL (ref 4–11)

## 2021-12-08 PROCEDURE — 99214 OFFICE O/P EST MOD 30 MIN: CPT | Performed by: INTERNAL MEDICINE

## 2021-12-08 RX ORDER — VORICONAZOLE 200 MG/1
TABLET, FILM COATED ORAL
COMMUNITY
Start: 2021-11-01 | End: 2021-12-08 | Stop reason: SDUPTHER

## 2021-12-08 RX ORDER — VORICONAZOLE 200 MG/1
200 TABLET, FILM COATED ORAL 2 TIMES DAILY
Qty: 60 TABLET | Refills: 6 | Status: SHIPPED | OUTPATIENT
Start: 2021-12-08 | End: 2022-01-07

## 2021-12-08 NOTE — PROGRESS NOTES
Physically Abused: Not on file    Sexually Abused: Not on file   Housing Stability:     Unable to Pay for Housing in the Last Year: Not on file    Number of Places Lived in the Last Year: Not on file    Unstable Housing in the Last Year: Not on file     Social History     Tobacco Use   Smoking Status Never Smoker   Smokeless Tobacco Never Used      Family History   Problem Relation Age of Onset    No Known Problems Mother     No Known Problems Father     No Known Problems Sister     No Known Problems Brother     No Known Problems Sister        REVIEW OF SYSTEMS:     CONSTITUTIONAL:  negative for fevers, chills, diaphoresis, activity change, appetite change, fatigue, night sweats and unexpected weight change.    EYES:  negative for blurred vision, eye discharge, visual disturbance and icterus  HEENT:  negative for hearing loss, tinnitus, ear drainage, sinus pressure, nasal congestion, epistaxis and snoring  RESPIRATORY:  ++ cough , no sob, no sputum production , no wheeze ,no hemoptysis   CARDIOVASCULAR:  negative for chest pain, palpitations, exertional chest pressure/discomfort, edema, syncope  GASTROINTESTINAL:  negative for nausea, vomiting, diarrhea, constipation, blood in stool and abdominal pain  GENITOURINARY:  negative for frequency, dysuria, urinary incontinence, decreased urine volume, and hematuria  HEMATOLOGIC/LYMPHATIC:  negative for easy bruising, bleeding and lymphadenopathy  ALLERGIC/IMMUNOLOGIC:  negative for recurrent infections, angioedema, anaphylaxis and drug reactions  ENDOCRINE:  negative for weight changes and diabetic symptoms including polyuria, polydipsia and polyphagia  MUSCULOSKELETAL:  negative for  pain, joint swelling, decreased range of motion and muscle weakness  INTEGUMENTARY: negative for skin color change, rashes, blisters  NEUROLOGICAL:  negative for headaches, slurred speech, unilateral weakness  PSYCHIATRIC/BEHAVIORAL: negative for hallucinations, behavioral problems, confusion and agitation. PHYSICAL EXAM:    Vitals:    Vitals:    12/08/21 0841   BP: 132/72   Pulse: 81   SpO2: 99%     General Appearance: alert,in no acute distress, no pallor, no icterus   Skin: warm and dry, no rash or erythema  Head: normocephalic and atraumatic  Eyes: pupils equal, round, and reactive to light, extraocular eye movements intact, conjunctivae normal  ENT: tympanic membrane, external ear and ear canal normal bilaterally, nose without deformity, nasal mucosa and turbinates normal without polyps  Neck: supple and non-tender without mass, no thyromegaly  no cervical lymphadenopathy  Pulmonary/Chest: clear to auscultation bilaterally- no wheezes, rales or rhonchi, normal air movement,  Cardiovascular: normal rate, regular rhythm, normal S1 and S2, no murmurs, rubs, clicks, or gallops,   Abdomen: soft, non-tender, non-distended, normal bowel sounds, no masses or organomegaly  Extremities: no cyanosis, clubbing or edema  Musculoskeletal: normal range of motion, no joint swelling   Integumentary: no petechiae, no purpura, no blisters  Neurologic: reflexes normal and symmetric, no cranial nerve deficit, speech normal   Psych:  Orientation, sensorium, mood normal       DATA:    CBC:   Lab Results   Component Value Date    WBC 5.5 12/08/2021    HGB 15.1 12/08/2021    HCT 44.8 12/08/2021    MCV 87.0 12/08/2021     12/08/2021     RENAL:   Lab Results   Component Value Date    CREATININE 0.8 (L) 12/08/2021    BUN 20 12/08/2021     12/08/2021    K 3.8 12/08/2021     12/08/2021    CO2 24 12/08/2021     SED RATE:   Lab Results   Component Value Date    SEDRATE 79 06/06/2018     CK:  No results found for: CKTOTAL  CRP:   Lab Results   Component Value Date    CRP 54.6 06/06/2018     Hepatic Function Panel:   Lab Results   Component Value Date    ALKPHOS 98 12/08/2021    ALT 25 12/08/2021    AST 28 12/08/2021    PROT 8.1 12/08/2021    BILITOT 0.4 12/08/2021    BILIDIR <0.2 08/11/2021    IBILI see below 08/11/2021    LABALBU 5.4 12/08/2021     UA:  Lab Results   Component Value Date    COLORU YELLOW 04/26/2019    CLARITYU Clear 04/26/2019    GLUCOSEU Negative 04/26/2019    BILIRUBINUR Negative 04/26/2019    KETUA Negative 04/26/2019    SPECGRAV 1.009 04/26/2019    BLOODU Negative 04/26/2019    PHUR 7.5 04/26/2019    PROTEINU Negative 04/26/2019    UROBILINOGEN 0.2 04/26/2019    NITRU Negative 04/26/2019    LEUKOCYTESUR Negative 04/26/2019    LABMICR Not Indicated 04/26/2019    URINETYPE Not Specified 04/26/2019      Urine Microscopic: No results found for: LABCAST, BACTERIA, COMU, HYALCAST, WBCUA, RBCUA, EPIU  Urine Reflex to Culture:   Lab Results   Component Value Date    URRFLXCULT Not Indicated 04/26/2019      : Final result     Visible to patient: Yes (not seen)     Next appt: None     Dx: Aspergillus pneumonia (HCC)     0 Result Notes     Ref Range & Units 6/23/21 1258   (1,3)-Beta-D-Glucan (Fungitell) Interpretation Negative Negative    Comment: INTERPRETIVE INFORMATION: (1,3)-beta-D-glucan (Fungitell)     Less than 31 pg/mL . .................. Negative     31-59 pg/mL . ......................... Negative     60-79 pg/mL . ......................... Indeterminate     Greater than or equal to 80 pg/mL . ... Positive   The Fungitell test is indicated for presumptive diagnosis   of fungal infection and should be used in          Result Notes     Ref Range & Units 6/23/21 1258   Aspergillus Galacto AG Negative Negative    Comment: INTERPRETIVE INFORMATION: Aspergillus Galactomannan Antigen by EIA   Negative results do not exclude the diagnosis of invasive   aspergillosis. A single positive test result (index equal   to or greater than 0.5) should be clinically correlated   by testing a separate serum specimen because many agents   (e.g. foods, antibiotics) may cross-react with the test.   If invasive aspergillosis is suspected in high-risk   patients, serial sampling is recommended.    Performed By: [facility]           MICRO: cultures reviewed and updated by me   Contains abnormal data Culture, Fungus  Order: [id number]   Status: Final result     Visible to patient: Yes (not seen)     Next appt: None     Dx: Hemoptysis    Specimen Information: BAL- Bronch. Lavage; Respiratory         0 Result Notes    Component 6/11/21 0811   Fungus Stain No Fungal elements seen    Organism Aspergillus fumigatus Abnormal     Fungus (Mycology) Culture POSITIVE for   No further workup    Organism Paecilomyces species Abnormal     Fungus (Mycology) Culture POSITIVE for   No further workup    Resulting Agency [facility]             Narrative  Performed by: [facility]  ORDER#: [id number]                          ORDERED BY: Shelia Robertson   SOURCE: Bronchial Alveolar Lavage          COLLECTED:  06/11/21 08:11   ANTIBIOTICS AT [place]. :                      RECEIVED :  06/11/21 10:58   Performed at:   [address] [facility] [facility]   Phone [phone]             Respiratory Culture:  Lab Results   Component Value Date    CULTRESP 50,000-100,000 CFU/mL Normal respiratory constantino 06/11/2021    CULTRESP  06/11/2021     50,000 to 100,000 CFU/mL  Beta Lactamase Negative  Sensitivities not routinely performed. Drugs of choice are:  Ampicillin, Ceftriaxone or Trimethoprim/Sulfamethoxazole.       LABGRAM  06/11/2021     No Epithelial Cells seen  1+ WBC's (Polymorphonuclear)  No organisms seen       AFB:  Lab Results   Component Value Date    AFBSMEAR No AFB observed by Fluorescent stain 06/11/2021     Aspergillus Galacto AG  Aspergillus Galact AG By EIA-A  Collected: 06/11/21 0811   Result status: Final   [facility] lab[facility]   Reference range: Negative   Value: Positive Abnormal     Comment: INTERPRETIVE INFORMATION: Aspergillus Galactomannan EIA, Broncho   A BAL galactomannan index of greater than or equal to 0.5   is considered positive.  This result should be interpreted in the   context of   patient history, clinical signs/symptoms, and other routine diagnostic   tests   (e.g., culture, histologic examination of biopsy material, and   radiographic   imaging). Performed By: [facility]   [address]        Urine Culture: No results for input(s): Barby Nobles in the last 72 hours. Imaging:  No orders to display       Labs, Microbiology and  Radiology results pertinent to this visit and from care every where  reviewed in detail by me as part of the consultation     IMPRESSION:     Diagnosis Orders   1. Aspergillus pneumonia (HCC)  CBC Auto Differential    COMPREHENSIVE METABOLIC PANEL    COMPREHENSIVE METABOLIC PANEL    ASPERGILLUS GALACTOMANNAN AG ASSAY    1,3 Beta-D-Glucan   2. Abnormal CT of the chest     3. Hemoptysis         Has been on his Oral V fend now since June 2021 and tolerating the meds well. Will repeat labs including Beta D glucan in follow up. He has intermittent Wheeze using PRN albuterol. Side effects d/w pt     PLAN:   1. Cont oral V fend as 200 mg Q 12 HR as before  2. Check labs CBC with diff, CMP  3. Side effects d/w pt   4. Follow up in 4 months  5. May repeat CT chest at that time  6. Bet D glucan assay  , Aspergillus antigen         Total time spent for this encounter:  32 min  on visit (including interval history,physical exam, review of data including labs, cultures, imaging,  ordering labs, development and implementation of treatment plan, co ordination of care, counseling and education ). --Maryse Mckinney MD on 12/11/2021 at 11:58 PM    An electronic signature was used to authenticate this note. D/w Patient in detail at  the time of consultation. Thanks for allowing me to participate in your patient's care and please do not hesitate to  call me with any questions or concerns.     Kinza Andersen MD  Infectious Disease  [facility]) Physician  Phone: [phone]   Fax : [phone]

## 2021-12-11 LAB
(1,3)-BETA-D-GLUCAN (FUNGITELL) INTERPRETATION: NEGATIVE
(1,3)-BETA-D-GLUCAN (FUNGITELL): <31 PG/ML
ASPERGILLUS GALACTO AG: NEGATIVE
ASPERGILLUS GALACTO INDEX: 0.05

## 2022-01-04 ENCOUNTER — OFFICE VISIT (OUTPATIENT)
Dept: PULMONOLOGY | Age: 32
End: 2022-01-04
Payer: COMMERCIAL

## 2022-01-04 VITALS
WEIGHT: 180 LBS | OXYGEN SATURATION: 98 % | TEMPERATURE: 97 F | RESPIRATION RATE: 12 BRPM | BODY MASS INDEX: 23.1 KG/M2 | HEIGHT: 74 IN | DIASTOLIC BLOOD PRESSURE: 77 MMHG | SYSTOLIC BLOOD PRESSURE: 139 MMHG | HEART RATE: 82 BPM

## 2022-01-04 DIAGNOSIS — B44.9 ASPERGILLUS PNEUMONIA (HCC): ICD-10-CM

## 2022-01-04 DIAGNOSIS — J45.40 MODERATE PERSISTENT ASTHMA WITHOUT COMPLICATION: Primary | ICD-10-CM

## 2022-01-04 PROCEDURE — 99214 OFFICE O/P EST MOD 30 MIN: CPT | Performed by: INTERNAL MEDICINE

## 2022-01-04 ASSESSMENT — ASTHMA QUESTIONNAIRES
QUESTION_1 LAST FOUR WEEKS HOW MUCH OF THE TIME DID YOUR ASTHMA KEEP YOU FROM GETTING AS MUCH DONE AT WORK, SCHOOL OR AT HOME: 4
QUESTION_5 LAST FOUR WEEKS HOW WOULD YOU RATE YOUR ASTHMA CONTROL: 3
QUESTION_3 LAST FOUR WEEKS HOW OFTEN DID YOUR ASTHMA SYMPTOMS (WHEEZING, COUGHING, SHORTNESS OF BREATH, CHEST TIGHTNESS OR PAIN) WAKE YOU UP AT NIGHT OR EARLIER THAN USUAL IN THE MORNING: 2
QUESTION_2 LAST FOUR WEEKS HOW OFTEN HAVE YOU HAD SHORTNESS OF BREATH: 2
QUESTION_4 LAST FOUR WEEKS HOW OFTEN HAVE YOU USED YOUR RESCUE INHALER OR NEBULIZER MEDICATION (SUCH AS ALBUTEROL): 2
ACT_TOTALSCORE: 13

## 2022-01-04 NOTE — PROGRESS NOTES
Chief complaint  This is a 27y.o. year old male  who comes to see me with a chief complaint of   Chief Complaint   Patient presents with    Follow-up       HPI  Here with a cc of asthma    Last visit I did bronchoscopy and diagnosed him with aspergillus pneumonia again. On Vfend, and may be on it for one year. [place] farm. Said he has improved since starting Vfend, but having more SOB, chest tightness, wheezing and heart racing. Using albuterol daily with some relief but does not last too long. Still with exertional fatigue as well. Also worried about higher than normal BP        Current Outpatient Medications:     voriconazole (VFEND) 200 MG tablet, Take 1 tablet by mouth 2 times daily, Disp: 60 tablet, Rfl: 6    albuterol sulfate HFA (PROAIR HFA) 108 (90 Base) MCG/ACT inhaler, Inhale 2 puffs into the lungs every 4 hours as needed for Wheezing or Shortness of Breath, Disp: 1 Inhaler, Rfl: 4        PHYSICAL EXAM:  GENERAL:  Well nourished, alert, appears stated age, no distress, flat affect   HEENT:  No scleral icterus, no conjunctival irritation  NECK:  No thyromegaly, no bruits  LYMPH:  No cervical or supraclavicular adenopathy  HEART:  Regular rate and rhythm, no murmurs  LUNGS:  Slight left sided wheeze   ABDOMEN:  No distention, no organomegaly  EXTREMITIES:  No edema, no digital clubbing  NEURO:  No localizing deficits, CN II-XII intact    Pulmonary Function Testing  None    Chest imaging from 1/2020 is reviewed and compared to 4/2019. My impression was that there was new ground glass changes in the ZOYA that were not present previously. Nodular opacities as well      ASTHMA CONTROL TEST 1/4/2022 6/3/2021 12/17/2020 6/10/2020   In the past 4 weeks, how much of the time did your asthma keep you from getting as much done at work, school or at home? 4 3 3 3   During the past 4 weeks, how often have you had shortness of breath?  2 2 3 2   During the past 4 weeks, how often did your asthma symptoms (wheezing, coughing, shortness of breath, chest tightness or pain) wake you up at night or earlier than usual in the morning? 2 2 2 2   During the past 4 weeks, how often have you used your rescue inhaler or nebulizer medication (such as albuterol)? 2 2 2 5   How would you rate your asthma control during the past 4 weeks? 3 3 3 4   Asthma Control Test Total Score 13 12 13 16       Assessment/Plan:  1. Moderate persistent asthma without complication  Continue with albuterol prn and prior to exertion. Need PFT before committing to maintenance. Likely to require an ICS inhalers  - Full PFT Study With Bronchodilator; Future    2. Aspergillus pneumonia ([facility] [address]  On Vfend. May have to completed 1 years worth of therapy. 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  Likely to start maintenance inhalers after PFT Negative

## 2022-01-21 ENCOUNTER — TELEPHONE (OUTPATIENT)
Dept: INFECTIOUS DISEASES | Age: 32
End: 2022-01-21

## 2022-02-07 ENCOUNTER — HOSPITAL ENCOUNTER (OUTPATIENT)
Dept: PULMONOLOGY | Age: 32
Discharge: HOME | End: 2022-02-07
Payer: COMMERCIAL

## 2022-02-07 DIAGNOSIS — B44.9 ASPERGILLUS PNEUMONIA (HCC): ICD-10-CM

## 2022-02-07 DIAGNOSIS — J45.40 MODERATE PERSISTENT ASTHMA WITHOUT COMPLICATION: ICD-10-CM

## 2022-02-07 LAB
A/G RATIO: 1.7 (ref 1.1–2.2)
ALBUMIN SERPL-MCNC: 4.8 G/DL (ref 3.4–5)
ALP BLD-CCNC: 109 U/L (ref 40–129)
ALT SERPL-CCNC: 24 U/L (ref 10–40)
ANION GAP SERPL CALCULATED.3IONS-SCNC: 18 MMOL/L (ref 3–16)
AST SERPL-CCNC: 22 U/L (ref 15–37)
BILIRUB SERPL-MCNC: 0.3 MG/DL (ref 0–1)
BUN BLDV-MCNC: 18 MG/DL (ref 7–20)
CALCIUM SERPL-MCNC: 9.5 MG/DL (ref 8.3–10.6)
CHLORIDE BLD-SCNC: 99 MMOL/L (ref 99–110)
CO2: 22 MMOL/L (ref 21–32)
CREAT SERPL-MCNC: 0.7 MG/DL (ref 0.9–1.3)
DLCO %PRED: 100 %
DLCO PRED: NORMAL
DLCO/VA %PRED: 137 %
DLCO/VA PRED: NORMAL
DLCO/VA: 6.7 ML/MIN/MMHG
DLCO: 37.88 ML/MIN/MMHG
EXPIRATORY TIME-POST: NORMAL
EXPIRATORY TIME: NORMAL
FEF 25-75% %CHNG: NORMAL
FEF 25-75% %PRED-POST: NORMAL
FEF 25-75% %PRED-PRE: NORMAL
FEF 25-75% PRED: NORMAL
FEF 25-75%-POST: NORMAL
FEF 25-75%-PRE: NORMAL
FEV1 %PRED-POST: 80 %
FEV1 %PRED-PRE: 88 %
FEV1 PRED: NORMAL
FEV1-POST: NORMAL
FEV1-PRE: NORMAL
FEV1/FVC %PRED-POST: NORMAL
FEV1/FVC %PRED-PRE: NORMAL
FEV1/FVC PRED: NORMAL
FEV1/FVC-POST: 89 %
FEV1/FVC-PRE: 91 %
FVC %PRED-POST: NORMAL
FVC %PRED-PRE: NORMAL
FVC PRED: NORMAL
FVC-POST: NORMAL
FVC-PRE: NORMAL
GAW %PRED: NORMAL
GAW PRED: NORMAL
GAW: NORMAL
GFR AFRICAN AMERICAN: >60
GFR NON-AFRICAN AMERICAN: >60
GLUCOSE BLD-MCNC: 78 MG/DL (ref 70–99)
IC %PRED: NORMAL
IC PRED: NORMAL
IC: NORMAL
MEP: NORMAL
MIP: NORMAL
MVV %PRED-PRE: NORMAL
MVV PRED: NORMAL
MVV-PRE: NORMAL
PEF %PRED-POST: NORMAL
PEF %PRED-PRE: NORMAL
PEF PRED: NORMAL
PEF%CHNG: NORMAL
PEF-POST: NORMAL
PEF-PRE: NORMAL
POTASSIUM SERPL-SCNC: 4.2 MMOL/L (ref 3.5–5.1)
RAW %PRED: NORMAL
RAW PRED: NORMAL
RAW: NORMAL
RV %PRED: NORMAL
RV PRED: NORMAL
RV: NORMAL
SODIUM BLD-SCNC: 139 MMOL/L (ref 136–145)
SVC %PRED: NORMAL
SVC PRED: NORMAL
SVC: NORMAL
TLC %PRED: 80 %
TLC PRED: NORMAL
TLC: NORMAL
TOTAL PROTEIN: 7.7 G/DL (ref 6.4–8.2)
VA %PRED: 73 %
VA PRED: NORMAL
VA: 5.65 L
VTG %PRED: NORMAL
VTG PRED: NORMAL
VTG: NORMAL

## 2022-02-07 PROCEDURE — 6370000000 HC RX 637 (ALT 250 FOR IP): Performed by: INTERNAL MEDICINE

## 2022-02-07 PROCEDURE — 94729 DIFFUSING CAPACITY: CPT

## 2022-02-07 PROCEDURE — 94664 DEMO&/EVAL PT USE INHALER: CPT

## 2022-02-07 PROCEDURE — 94726 PLETHYSMOGRAPHY LUNG VOLUMES: CPT | Performed by: INTERNAL MEDICINE

## 2022-02-07 PROCEDURE — 94060 EVALUATION OF WHEEZING: CPT | Performed by: INTERNAL MEDICINE

## 2022-02-07 PROCEDURE — 94060 EVALUATION OF WHEEZING: CPT

## 2022-02-07 PROCEDURE — 94640 AIRWAY INHALATION TREATMENT: CPT

## 2022-02-07 PROCEDURE — 94726 PLETHYSMOGRAPHY LUNG VOLUMES: CPT

## 2022-02-07 PROCEDURE — 94729 DIFFUSING CAPACITY: CPT | Performed by: INTERNAL MEDICINE

## 2022-02-07 RX ORDER — ALBUTEROL SULFATE 90 UG/1
4 AEROSOL, METERED RESPIRATORY (INHALATION) ONCE
Status: COMPLETED | OUTPATIENT
Start: 2022-02-07 | End: 2022-02-07

## 2022-02-07 RX ADMIN — ALBUTEROL SULFATE 4 PUFF: 90 AEROSOL, METERED RESPIRATORY (INHALATION) at 11:42

## 2022-02-07 ASSESSMENT — PULMONARY FUNCTION TESTS
FEV1/FVC_PRE: 91
FEV1/FVC_POST: 89
FEV1_PERCENT_PREDICTED_PRE: 88
FEV1_PERCENT_PREDICTED_POST: 80

## 2022-02-07 NOTE — PROCEDURES
Spirometry was acceptable and reproducible by ATS standards    Spirometry  1. There is no demonstrable obstructive defect. Bronchodilator  1. There is no response to bronchodilator demonstrated. [Increase in FEV1 and/or FVC => 12% of control and => 200 ml]    Lung Volumes  2. Lung volumes are at the lower limit of normal.    Diffusing Capacity  3. Diffusing capacity is normal.        Overall Interpretation  PFT does not demonstrates obstruction with FEV1 of 88 %  FVC of 78%  without bronchodilator response. Normal lung volume without air trapping or hyperinflation Diffusion capacity is normal  This is consistent with normal PFT.       Pulmonary Function Testing Results:    FEV1 %Pred-Pre   Date Value Ref Range Status   02/07/2022 88 % Final     FEV1 %Pred-Post   Date Value Ref Range Status   02/07/2022 80 % Final     FEV1/FVC-Pre   Date Value Ref Range Status   02/07/2022 91 % Final     FEV1/FVC-Post   Date Value Ref Range Status   02/07/2022 89 % Final     TLC %Pred   Date Value Ref Range Status   02/07/2022 80 % Final     DLCO %Pred   Date Value Ref Range Status   02/07/2022 100 % Final     DLCO/VA %Pred   Date Value Ref Range Status   02/07/2022 137 % Final             OBSTRUCTION % Predicted FEV1   MILD >70%   MODERATE 60-69%   MODERATELY-SEVERE 50-59%   SEVERE 35-49%   VERY SEVERE <35%         RESTRICTION % Predicted TLC   MILD Less than LLN but > than 70%   MODERATE 60-69%   MODERATELY-SEVERE <60%                 DIFFUSION CAPACITY DL,CO % Pred   MILD >60% AND < LLN   MODERATE 40-60%   SEVERE <40%       PFT data will be scanned into the procedure tab in epic under this encounter    Bassam Hernandez MD  [facility]

## 2022-02-28 DIAGNOSIS — J45.20 MILD INTERMITTENT ASTHMA WITHOUT COMPLICATION: ICD-10-CM

## 2022-03-01 RX ORDER — ALBUTEROL SULFATE 90 UG/1
AEROSOL, METERED RESPIRATORY (INHALATION)
Qty: 8.5 G | Refills: 11 | Status: SHIPPED | OUTPATIENT
Start: 2022-03-01

## 2022-03-09 ENCOUNTER — OFFICE VISIT (OUTPATIENT)
Dept: PULMONOLOGY | Age: 32
End: 2022-03-09
Payer: COMMERCIAL

## 2022-03-09 ENCOUNTER — OFFICE VISIT (OUTPATIENT)
Dept: INFECTIOUS DISEASES | Age: 32
End: 2022-03-09
Payer: COMMERCIAL

## 2022-03-09 VITALS
BODY MASS INDEX: 22.46 KG/M2 | SYSTOLIC BLOOD PRESSURE: 125 MMHG | OXYGEN SATURATION: 98 % | HEIGHT: 74 IN | DIASTOLIC BLOOD PRESSURE: 70 MMHG | HEART RATE: 81 BPM | WEIGHT: 175 LBS

## 2022-03-09 VITALS
OXYGEN SATURATION: 98 % | SYSTOLIC BLOOD PRESSURE: 125 MMHG | DIASTOLIC BLOOD PRESSURE: 70 MMHG | HEIGHT: 74 IN | WEIGHT: 175.8 LBS | HEART RATE: 81 BPM | TEMPERATURE: 98.7 F | BODY MASS INDEX: 22.56 KG/M2 | RESPIRATION RATE: 18 BRPM

## 2022-03-09 DIAGNOSIS — J18.9 RECURRENT PNEUMONIA: ICD-10-CM

## 2022-03-09 DIAGNOSIS — R93.89 ABNORMAL CT OF THE CHEST: ICD-10-CM

## 2022-03-09 DIAGNOSIS — J45.40 MODERATE PERSISTENT ASTHMA WITHOUT COMPLICATION: Primary | ICD-10-CM

## 2022-03-09 DIAGNOSIS — G47.10 HYPERSOMNIA: ICD-10-CM

## 2022-03-09 DIAGNOSIS — G47.33 OSA (OBSTRUCTIVE SLEEP APNEA): ICD-10-CM

## 2022-03-09 DIAGNOSIS — R06.83 SNORING: ICD-10-CM

## 2022-03-09 DIAGNOSIS — B44.9 ASPERGILLUS PNEUMONIA (HCC): ICD-10-CM

## 2022-03-09 DIAGNOSIS — B44.9 ASPERGILLUS PNEUMONIA (HCC): Primary | ICD-10-CM

## 2022-03-09 DIAGNOSIS — Z23 NEED FOR VACCINATION FOR PNEUMOCOCCUS: ICD-10-CM

## 2022-03-09 PROCEDURE — 99215 OFFICE O/P EST HI 40 MIN: CPT | Performed by: INTERNAL MEDICINE

## 2022-03-09 PROCEDURE — 90471 IMMUNIZATION ADMIN: CPT | Performed by: INTERNAL MEDICINE

## 2022-03-09 PROCEDURE — 90670 PCV13 VACCINE IM: CPT | Performed by: INTERNAL MEDICINE

## 2022-03-09 PROCEDURE — 99214 OFFICE O/P EST MOD 30 MIN: CPT | Performed by: INTERNAL MEDICINE

## 2022-03-09 RX ORDER — VORICONAZOLE 200 MG/1
TABLET, FILM COATED ORAL
COMMUNITY
Start: 2022-03-01 | End: 2022-03-09 | Stop reason: SDUPTHER

## 2022-03-09 RX ORDER — FLUTICASONE FUROATE 100 UG/1
1 POWDER RESPIRATORY (INHALATION) DAILY
Qty: 1 EACH | Refills: 5 | Status: SHIPPED | OUTPATIENT
Start: 2022-03-09

## 2022-03-09 RX ORDER — VORICONAZOLE 200 MG/1
200 TABLET, FILM COATED ORAL 2 TIMES DAILY
Qty: 60 TABLET | Refills: 5 | Status: SHIPPED | OUTPATIENT
Start: 2022-03-09 | End: 2022-04-08

## 2022-03-09 ASSESSMENT — ASTHMA QUESTIONNAIRES
QUESTION_1 LAST FOUR WEEKS HOW MUCH OF THE TIME DID YOUR ASTHMA KEEP YOU FROM GETTING AS MUCH DONE AT WORK, SCHOOL OR AT HOME: 3
QUESTION_4 LAST FOUR WEEKS HOW OFTEN HAVE YOU USED YOUR RESCUE INHALER OR NEBULIZER MEDICATION (SUCH AS ALBUTEROL): 2
QUESTION_5 LAST FOUR WEEKS HOW WOULD YOU RATE YOUR ASTHMA CONTROL: 3
ACT_TOTALSCORE: 13
QUESTION_3 LAST FOUR WEEKS HOW OFTEN DID YOUR ASTHMA SYMPTOMS (WHEEZING, COUGHING, SHORTNESS OF BREATH, CHEST TIGHTNESS OR PAIN) WAKE YOU UP AT NIGHT OR EARLIER THAN USUAL IN THE MORNING: 2
QUESTION_2 LAST FOUR WEEKS HOW OFTEN HAVE YOU HAD SHORTNESS OF BREATH: 3

## 2022-03-09 ASSESSMENT — SLEEP AND FATIGUE QUESTIONNAIRES
HOW LIKELY ARE YOU TO NOD OFF OR FALL ASLEEP WHILE SITTING AND TALKING TO SOMEONE: 3
HOW LIKELY ARE YOU TO NOD OFF OR FALL ASLEEP WHILE WATCHING TV: 3
ESS TOTAL SCORE: 19
HOW LIKELY ARE YOU TO NOD OFF OR FALL ASLEEP WHEN YOU ARE A PASSENGER IN A CAR FOR AN HOUR WITHOUT A BREAK: 2
HOW LIKELY ARE YOU TO NOD OFF OR FALL ASLEEP WHILE LYING DOWN TO REST IN THE AFTERNOON WHEN CIRCUMSTANCES PERMIT: 3
HOW LIKELY ARE YOU TO NOD OFF OR FALL ASLEEP IN A CAR, WHILE STOPPED FOR A FEW MINUTES IN TRAFFIC: 1
HOW LIKELY ARE YOU TO NOD OFF OR FALL ASLEEP WHILE SITTING QUIETLY AFTER LUNCH WITHOUT ALCOHOL: 2
HOW LIKELY ARE YOU TO NOD OFF OR FALL ASLEEP WHILE SITTING AND READING: 3
HOW LIKELY ARE YOU TO NOD OFF OR FALL ASLEEP WHILE SITTING INACTIVE IN A PUBLIC PLACE: 2
NECK CIRCUMFERENCE (INCHES): 15

## 2022-03-09 NOTE — PROGRESS NOTES
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 He is on   oral V fend due to his Resp symptoms. 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 He did have +vE Beta D glucan assay back in 2020. He has some reactive airway disease and on Albuterol inhaler does get some SOB with exertion but no hemoptysis no oral lesions no skin changes, Lfts stable tolerating the meds ok . We discussed about occupational safety on several occasions to wear proper gear to avoid inhalation of spore and Mold. His wife was with him in the clinic today.        Past Medical History:    Past Medical History:   Diagnosis Date    Asthma     Lung abscess ([address]     PNA (pneumonia)     Pneumonia     Aspergillius fungus    Pneumonia 05/2018       Past Surgical History:    Past Surgical History:   Procedure Laterality Date    BRONCHOSCOPY  2018    BRONCHOSCOPY      BRONCHOSCOPY N/A 6/11/2021    BRONCHOSCOPY WITH BRONCHIOLAR ALVEOLAR LAVAGE performed by Roman Walker DO at [address] [place] [address]  6/11/2021    BRONCHOSCOPY DIAGNOSTIC OR CELL [address] ONLY performed by Roman Walker DO at [address] [facility]         Current Medications:    Current Outpatient Medications   Medication Sig Dispense Refill    fluticasone (ARNUITY ELLIPTA) 100 MCG/ACT AEPB Inhale 1 puff into the lungs daily 1 each 5    voriconazole (VFEND) 200 MG tablet Take 1 tablet by mouth 2 times daily 60 tablet 5    albuterol sulfate  (90 Base) MCG/ACT inhaler INHALE TWO PUFFS BY MOUTH EVERY 4 HOURS AS NEEDED FOR WHEEZING OR FOR SHORTNESS OF BREATH 8.5 g 11     No current facility-administered medications for this visit. Allergies:  Singulair [montelukast sodium], Tiotropium bromide monohydrate, and Vancomycin      Immunizations :   Immunization History   Administered Date(s) Administered    COVID-19, Pfizer Purple top, DILUTE for use, 12+ yrs, 30mcg/0.3mL dose 04/15/2021, 05/13/2021    PPD Test 06/05/2018    Pneumococcal Conjugate 13-valent ([place]) 03/09/2022           Social History:    Social History     Socioeconomic History    Marital status:      Spouse name: Not on file    Number of children: Not on file    Years of education: Not on file    Highest education level: Not on file   Occupational History    Not on file   Tobacco Use    Smoking status: Never Smoker    Smokeless tobacco: Never Used   Vaping Use    Vaping Use: Never used   Substance and Sexual Activity    Alcohol use: Yes     Comment: rarely    Drug use: Never    Sexual activity: Yes     Partners: Female   Other Topics Concern    Not on file   Social History Narrative    Not on file     Social Determinants of Health     Financial Resource Strain:     Difficulty of Paying Living Expenses: Not on file   Food Insecurity:     Worried About [address] in the Last Year: Not on file    [address] in the Last Year: Not on file   Transportation Needs:     Lack of Transportation (Medical): Not on file    Lack of Transportation (Non-Medical):  Not on file   Physical Activity:     Days of Exercise per Week: Not on file    Minutes of Exercise per Session: Not on file   Stress:     Feeling of Stress : Not on file   Social Connections:     Frequency of Communication with Friends and Family: Not on file    Frequency of Social Gatherings with Friends and Family: Not on file    Attends Restoration Services: Not on file    Active Member of Clubs or Organizations: Not on file    Attends Club or Organization Meetings: Not on file    Marital Status: Not on file   Intimate Partner Violence:     Fear of Current or Ex-Partner: Not on file    Emotionally Abused: Not on file    Physically Abused: Not on file    Sexually Abused: Not on file   Housing Stability:     Unable to Pay for Housing in the Last Year: Not on file    Number of [place] in the Last Year: Not on file    Unstable Housing in the Last Year: Not on file     Social History     Tobacco Use   Smoking Status Never Smoker   Smokeless Tobacco Never Used      Family History   Problem Relation Age of Onset    No Known Problems Mother     No Known Problems Father     No Known Problems Sister     No Known Problems Brother     No Known Problems Sister        REVIEW OF SYSTEMS:      CONSTITUTIONAL:  negative for fevers, chills, diaphoresis, activity change, appetite change, fatigue, night sweats and unexpected weight change.    EYES:  negative for blurred vision, eye discharge, visual disturbance and icterus  HEENT:  negative for hearing loss, tinnitus, ear drainage, sinus pressure, nasal congestion, epistaxis and snoring  RESPIRATORY:  No cough , sob + on exertion , no sputum production , no wheeze ,no hemoptysis   CARDIOVASCULAR:  negative for chest pain, palpitations, exertional chest pressure/discomfort, edema, syncope  GASTROINTESTINAL:  negative for nausea, vomiting, diarrhea, constipation, blood in stool and abdominal pain  GENITOURINARY:  negative for frequency, dysuria, urinary incontinence, decreased urine volume, and hematuria  HEMATOLOGIC/LYMPHATIC:  negative for easy bruising, bleeding and lymphadenopathy  ALLERGIC/IMMUNOLOGIC:  negative for recurrent infections, angioedema, anaphylaxis and drug reactions  ENDOCRINE:  negative for weight changes and diabetic symptoms including polyuria, SEDRATE 79 06/06/2018     CK: No results found for: CKTOTAL  CRP:   Lab Results   Component Value Date    CRP 54.6 06/06/2018     Hepatic Function Panel:   Lab Results   Component Value Date    ALKPHOS 109 02/07/2022    ALT 24 02/07/2022    AST 22 02/07/2022    PROT 7.7 02/07/2022    BILITOT 0.3 02/07/2022    BILIDIR <0.2 08/11/2021    IBILI see below 08/11/2021    LABALBU 4.8 02/07/2022     UA:  Lab Results   Component Value Date    COLORU YELLOW 04/26/2019    CLARITYU Clear 04/26/2019    GLUCOSEU Negative 04/26/2019    BILIRUBINUR Negative 04/26/2019    KETUA Negative 04/26/2019    SPECGRAV 1.009 04/26/2019    BLOODU Negative 04/26/2019    PHUR 7.5 04/26/2019    PROTEINU Negative 04/26/2019    UROBILINOGEN 0.2 04/26/2019    NITRU Negative 04/26/2019    LEUKOCYTESUR Negative 04/26/2019    LABMICR Not Indicated 04/26/2019    URINETYPE Not Specified 04/26/2019      Urine Microscopic: No results found for: LABCAST, BACTERIA, COMU, HYALCAST, WBCUA, RBCUA, EPIU  Urine Reflex to Culture:   Lab Results   Component Value Date    URRFLXCULT Not Indicated 04/26/2019       MICRO: cultures reviewed and updated by me     Respiratory Culture:  Lab Results   Component Value Date    CULTRESP 50,000-100,000 CFU/mL Normal respiratory constantino 06/11/2021    CULTRESP  06/11/2021     50,000 to 100,000 CFU/mL  Beta Lactamase Negative  Sensitivities not routinely performed. Drugs of choice are:  Ampicillin, Ceftriaxone or Trimethoprim/Sulfamethoxazole.       LABGRAM  06/11/2021     No Epithelial Cells seen  1+ WBC's (Polymorphonuclear)  No organisms seen       AFB:  Lab Results   Component Value Date    AFBSMEAR No AFB observed by Fluorescent stain 06/11/2021       Result Notes    Component 6/11/21 0811   Fungus Stain No Fungal elements seen    Organism Aspergillus fumigatus Abnormal     Fungus (Mycology) Culture POSITIVE for   No further workup    Organism Paecilomyces species Abnormal     Fungus (Mycology) Culture POSITIVE for   No further workup    Resulting Agency [address] [address] [facility]             Narrative  Performed by: [address] [facility]  ORDER#: [id number]                          ORDERED BY: Giorgio Varghese   SOURCE: Bronchial Alveolar Lavage          COLLECTED:  06/11/21 08:11   ANTIBIOTICS AT [place]. :                      RECEIVED :  06/11/21 10:58   Performed at:   [facility]   [address] [facility] [address]   Phone [phone]             Urine Culture: No results for input(s): Aris Ga in the last 72 hours. Imaging:  No orders to display     FINDINGS:   Mediastinum: The unenhanced heart is unremarkable.  There are no enlarged   thoracic lymph nodes.  Calcified granulomatous disease is noted.       Lungs/pleura: The tracheobronchial tree is patent. [place] Pott is no pneumothorax   or pleural effusion. [place] Pott is biapical scarring.       There is complete resolution of the previously noted left upper lobe and   lingular bronchiolitis.  However, there is subtle right upper lobe   ground-glass opacities favoring infectious/inflammatory process.       Upper Abdomen: Images of the upper abdomen are unremarkable.       Soft Tissues/Bones: Degenerative changes involve the thoracic spine.           Impression   1. Subtle right upper lobe ground-glass opacities favoring   infectious/inflammatory process.           Labs, Microbiology and  Radiology results pertinent to this visit and from care every where  reviewed in detail by me as part of the consultation     IMPRESSION:     Diagnosis Orders   1. Aspergillus pneumonia ([facility]  CBC with Auto Differential    Comprehensive Metabolic Panel   2. Need for vaccination for pneumococcus  PREVNAR 13 IM (Pneumococcal conjugate vaccine 13-valent)   3. Abnormal CT of the chest     4. Recurrent pneumonia       He is tolerating oral V fend therapy well and no hemoptysis and some sob with exertion and using Albuterol as needed.  Will complete x 1 yrs therapy this June 2022 and plan to arrange CT chest for  Follow up. Previous immune def work up has been negative and suspected occupational exposures given the prior episodes of Aspergillus Lung infection- If CT has any chronic changes then we may have to cont oral V fend therapy on long term basis. PLAN:    1. Cont Oral V fend x 200 mg  X twice a day for 3  More months   2. Labs stable   3. Follow up check CMP every 8 weeks  4. CT chest in x  3  Months  5. Up date on Pneumovax 13 TODAY   6. Occupational safety d/w pt   7. Follow up  X 3 months    Patient identification was verified at the start of the visit: Yes     Total time spent for this encounter: 42 min   on visit (including interval history,physical exam, review of data including labs, cultures, imaging,  ordering labs, development and implementation of treatment plan, co ordination of care, counseling and education ). --Tristian Alberts MD on 3/14/2022 at 1:41 AM    An electronic signature was used to authenticate this note. D/w Patient in detail at  the time of consultation. Thanks for allowing me to participate in your patient's care and please do not hesitate to  call me with any questions or concerns.     Mateusz Norwood MD  Infectious Disease  [facility] ([facility]) Physician  Phone: [phone]   Fax : [phone]

## 2022-03-09 NOTE — PROGRESS NOTES
Chief complaint  This is a 27y.o. year old male  who comes to see me with a chief complaint of   Chief Complaint   Patient presents with    Follow-up     2m    Asthma       HPI  Here with a cc of asthma    He did PFT since last visit and they were normal.  He has been using albuterol bid on routine rather than as needed. Has not noticed too much of help with the inhaler but then again not using it when needed. Wife is present and mentions how much more he is snoring of late. She thinks he chokes as well. 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 She thinks he stops breathing from time to time, but his snoring was so loud the other day she had to leave the bed. Sleep Medicine 3/9/2022   Sitting and reading 3   Watching TV 3   Sitting, inactive in a public place (e.g. a theatre or a meeting) 2   As a passenger in a car for an hour without a break 2   Lying down to rest in the afternoon when circumstances permit 3   Sitting and talking to someone 3   Sitting quietly after a lunch without alcohol 2   In a car, while stopped for a few minutes in traffic 1   Total score 19   Neck circumference (Inches) 15         Current Outpatient Medications:     voriconazole (VFEND) 200 MG tablet, , Disp: , Rfl:     fluticasone (ARNUITY ELLIPTA) 100 MCG/ACT AEPB, Inhale 1 puff into the lungs daily, Disp: 1 each, Rfl: 5    albuterol sulfate  (90 Base) MCG/ACT inhaler, INHALE TWO PUFFS BY MOUTH EVERY 4 HOURS AS NEEDED FOR WHEEZING OR FOR SHORTNESS OF BREATH, Disp: 8.5 g, Rfl: 11        PHYSICAL EXAM:  GENERAL:  Well nourished, alert, appears stated age, no distress, flat affect   HEENT:  No scleral icterus, no conjunctival irritation.   Mallampati IV   NECK:  No thyromegaly, no bruits  LYMPH:  No cervical or supraclavicular adenopathy  HEART:  Regular rate and rhythm, no murmurs  LUNGS:  Diminished but clear   ABDOMEN:  No distention, no organomegaly  EXTREMITIES:  No edema, no digital clubbing  NEURO:  No localizing deficits, CN II-XII intact    Pulmonary Function Testing 2/22  PFT does not demonstrates obstruction with FEV1 of 88 %  FVC of 78%  without bronchodilator response. Normal lung volume without air trapping or hyperinflation Diffusion capacity is normal  This is consistent with normal PFT. Chest imaging from 6/21 is reviewed my impression is mild ground glass opacity     ASTHMA CONTROL TEST 3/9/2022 1/4/2022 6/3/2021 12/17/2020 6/10/2020   In the past 4 weeks, how much of the time did your asthma keep you from getting as much done at work, school or at home? 3 4 3 3 3   During the past 4 weeks, how often have you had shortness of breath? 3 2 2 3 2   During the past 4 weeks, how often did your asthma symptoms (wheezing, coughing, shortness of breath, chest tightness or pain) wake you up at night or earlier than usual in the morning? 2 2 2 2 2   During the past 4 weeks, how often have you used your rescue inhaler or nebulizer medication (such as albuterol)? 2 2 2 2 5   How would you rate your asthma control during the past 4 weeks? 3 3 3 3 4   Asthma Control Test Total Score 13 13 12 13 16       Assessment/Plan:  1. Moderate persistent asthma without complication  PFTs normal, some subjective response to albuterol but not all of the time. Will try Arnuity once a day. 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 Aspergillus pneumonia ([facility] [address]  Vfend per Vodela    3. Snoring  Risk for MARTÍNEZ  - Home Sleep Study; Future    4. Hypersomnia  [place] is 18  - Home Sleep Study; Future    5. MARTÍNEZ (obstructive sleep apnea)  High risk for this.  HST. APAP if positive   - Home Sleep Study;  Future    Will have to follow up after receiving CPAP machine if indicated  Trial of ICS inhaler

## 2022-03-11 ENCOUNTER — TELEPHONE (OUTPATIENT)
Dept: PULMONOLOGY | Age: 32
End: 2022-03-11

## 2022-03-11 DIAGNOSIS — J45.40 MODERATE PERSISTENT ASTHMA WITHOUT COMPLICATION: Primary | ICD-10-CM

## 2022-03-11 NOTE — TELEPHONE ENCOUNTER
Arnuity is not coverd    Covered  Fluticasone-salmeterol  Trelegy ellipta  spiriva respimat  flovent hfa  flovent diskus  anoro ellipta  symbicort    Please advise

## 2022-03-14 RX ORDER — BUDESONIDE AND FORMOTEROL FUMARATE DIHYDRATE 160; 4.5 UG/1; UG/1
2 AEROSOL RESPIRATORY (INHALATION) 2 TIMES DAILY
Qty: 1 EACH | Refills: 11 | Status: SHIPPED | OUTPATIENT
Start: 2022-03-14

## 2022-03-23 ENCOUNTER — HOSPITAL ENCOUNTER (OUTPATIENT)
Dept: SLEEP CENTER | Age: 32
Discharge: HOME | End: 2022-03-23
Payer: COMMERCIAL

## 2022-03-23 DIAGNOSIS — G47.33 OSA (OBSTRUCTIVE SLEEP APNEA): ICD-10-CM

## 2022-03-23 DIAGNOSIS — G47.10 HYPERSOMNIA: ICD-10-CM

## 2022-03-23 DIAGNOSIS — R06.83 SNORING: ICD-10-CM

## 2022-03-23 PROCEDURE — 95806 SLEEP STUDY UNATT&RESP EFFT: CPT

## 2022-03-25 PROCEDURE — 95806 SLEEP STUDY UNATT&RESP EFFT: CPT | Performed by: PSYCHIATRY & NEUROLOGY

## 2022-03-28 ENCOUNTER — TELEPHONE (OUTPATIENT)
Dept: PULMONOLOGY | Age: 32
End: 2022-03-28

## 2022-03-28 NOTE — TELEPHONE ENCOUNTER
LM with patient's spouse to call the office. This is in regards to his recent order for a CPAP machine and the need for his DME of choice.

## 2022-03-29 ENCOUNTER — TELEPHONE (OUTPATIENT)
Dept: PULMONOLOGY | Age: 32
End: 2022-03-29

## 2022-03-29 NOTE — TELEPHONE ENCOUNTER
SW patient he wants [facility] in [place], [place] as his DME for his CPAP machine. Will send over the order.

## 2022-04-18 ENCOUNTER — TELEPHONE (OUTPATIENT)
Dept: PULMONOLOGY | Age: 32
End: 2022-04-18

## 2022-04-21 DIAGNOSIS — B44.9 ASPERGILLUS PNEUMONIA (HCC): ICD-10-CM

## 2022-04-21 LAB
A/G RATIO: 1.9 (ref 1.1–2.2)
ALBUMIN SERPL-MCNC: 4.9 G/DL (ref 3.4–5)
ALP BLD-CCNC: 95 U/L (ref 40–129)
ALT SERPL-CCNC: 29 U/L (ref 10–40)
ANION GAP SERPL CALCULATED.3IONS-SCNC: 15 MMOL/L (ref 3–16)
AST SERPL-CCNC: 22 U/L (ref 15–37)
BASOPHILS ABSOLUTE: 0 K/UL (ref 0–0.2)
BASOPHILS RELATIVE PERCENT: 0.3 %
BILIRUB SERPL-MCNC: 0.4 MG/DL (ref 0–1)
BUN BLDV-MCNC: 21 MG/DL (ref 7–20)
CALCIUM SERPL-MCNC: 9.5 MG/DL (ref 8.3–10.6)
CHLORIDE BLD-SCNC: 102 MMOL/L (ref 99–110)
CO2: 24 MMOL/L (ref 21–32)
CREAT SERPL-MCNC: 0.8 MG/DL (ref 0.9–1.3)
EOSINOPHILS ABSOLUTE: 0 K/UL (ref 0–0.6)
EOSINOPHILS RELATIVE PERCENT: 0.8 %
GFR AFRICAN AMERICAN: >60
GFR NON-AFRICAN AMERICAN: >60
GLUCOSE BLD-MCNC: 110 MG/DL (ref 70–99)
HCT VFR BLD CALC: 39.9 % (ref 40.5–52.5)
HEMOGLOBIN: 13.5 G/DL (ref 13.5–17.5)
LYMPHOCYTES ABSOLUTE: 1.3 K/UL (ref 1–5.1)
LYMPHOCYTES RELATIVE PERCENT: 21.8 %
MCH RBC QN AUTO: 29.2 PG (ref 26–34)
MCHC RBC AUTO-ENTMCNC: 33.9 G/DL (ref 31–36)
MCV RBC AUTO: 86.4 FL (ref 80–100)
MONOCYTES ABSOLUTE: 0.3 K/UL (ref 0–1.3)
MONOCYTES RELATIVE PERCENT: 5.8 %
NEUTROPHILS ABSOLUTE: 4.3 K/UL (ref 1.7–7.7)
NEUTROPHILS RELATIVE PERCENT: 71.3 %
PDW BLD-RTO: 13.2 % (ref 12.4–15.4)
PLATELET # BLD: 279 K/UL (ref 135–450)
PMV BLD AUTO: 8.5 FL (ref 5–10.5)
POTASSIUM SERPL-SCNC: 4.2 MMOL/L (ref 3.5–5.1)
RBC # BLD: 4.62 M/UL (ref 4.2–5.9)
SODIUM BLD-SCNC: 141 MMOL/L (ref 136–145)
TOTAL PROTEIN: 7.5 G/DL (ref 6.4–8.2)
WBC # BLD: 6 K/UL (ref 4–11)

## 2022-05-02 DIAGNOSIS — K12.0 ORAL APHTHOUS ULCER: Primary | ICD-10-CM

## 2022-05-02 NOTE — TELEPHONE ENCOUNTER
SW patient regarding this information. Unfortunately the patient states he is using a spacer and rinsing his mouth, but is still experiencing blisters on his head and mouth with bleeding. He has been back on the Symbicort for about a week now, and experiencing symptoms for the last 4-5 days. Please advise.

## 2022-05-02 NOTE — TELEPHONE ENCOUNTER
Patient wife calling stating the patient had a reaction to Symbicort in the past and was rx something different but insurance wouldn't t cover. He is now trying Symbicort again to make sure that was what he had the reaction to and his mouth is breaking out again with blisters and bleeding.

## 2022-05-03 NOTE — TELEPHONE ENCOUNTER
I sent in nystatin mouth wash.   If this does not help he probably needs to stop the symbicort and use albuterol alone

## 2022-05-03 NOTE — TELEPHONE ENCOUNTER
SW the patient. He will call to keep us updated if the mouth wash works. If not he will stop using the Symbicort.

## 2022-07-20 ENCOUNTER — OFFICE VISIT (OUTPATIENT)
Dept: INFECTIOUS DISEASES | Age: 32
End: 2022-07-20
Payer: COMMERCIAL

## 2022-07-20 VITALS
HEART RATE: 65 BPM | WEIGHT: 175.6 LBS | HEIGHT: 74 IN | TEMPERATURE: 98.6 F | SYSTOLIC BLOOD PRESSURE: 121 MMHG | DIASTOLIC BLOOD PRESSURE: 75 MMHG | BODY MASS INDEX: 22.54 KG/M2

## 2022-07-20 DIAGNOSIS — R93.89 ABNORMAL CT OF THE CHEST: ICD-10-CM

## 2022-07-20 DIAGNOSIS — J18.9 RECURRENT PNEUMONIA: ICD-10-CM

## 2022-07-20 DIAGNOSIS — B44.9 ASPERGILLUS PNEUMONIA (HCC): ICD-10-CM

## 2022-07-20 DIAGNOSIS — B44.9 ASPERGILLUS PNEUMONIA (HCC): Primary | ICD-10-CM

## 2022-07-20 DIAGNOSIS — R59.0 MEDIASTINAL ADENOPATHY: ICD-10-CM

## 2022-07-20 DIAGNOSIS — R04.2 HEMOPTYSIS: ICD-10-CM

## 2022-07-20 LAB
A/G RATIO: 1.7 (ref 1.1–2.2)
ALBUMIN SERPL-MCNC: 5 G/DL (ref 3.4–5)
ALP BLD-CCNC: 96 U/L (ref 40–129)
ALT SERPL-CCNC: 28 U/L (ref 10–40)
ANION GAP SERPL CALCULATED.3IONS-SCNC: 11 MMOL/L (ref 3–16)
AST SERPL-CCNC: 22 U/L (ref 15–37)
BILIRUB SERPL-MCNC: 0.5 MG/DL (ref 0–1)
BUN BLDV-MCNC: 19 MG/DL (ref 7–20)
CALCIUM SERPL-MCNC: 9.6 MG/DL (ref 8.3–10.6)
CHLORIDE BLD-SCNC: 103 MMOL/L (ref 99–110)
CO2: 26 MMOL/L (ref 21–32)
CREAT SERPL-MCNC: 0.7 MG/DL (ref 0.9–1.3)
GFR AFRICAN AMERICAN: >60
GFR NON-AFRICAN AMERICAN: >60
GLUCOSE BLD-MCNC: 94 MG/DL (ref 70–99)
POTASSIUM SERPL-SCNC: 4.4 MMOL/L (ref 3.5–5.1)
SODIUM BLD-SCNC: 140 MMOL/L (ref 136–145)
TOTAL PROTEIN: 7.9 G/DL (ref 6.4–8.2)

## 2022-07-20 PROCEDURE — 99215 OFFICE O/P EST HI 40 MIN: CPT | Performed by: INTERNAL MEDICINE

## 2022-07-20 RX ORDER — VORICONAZOLE 200 MG/1
200 TABLET, FILM COATED ORAL 2 TIMES DAILY
COMMUNITY

## 2022-07-20 NOTE — PROGRESS NOTES
Infectious Diseases Outpatient Follow-up Note      Primary Care Physician:  Gricel Hitchcock MD       History Obtained From:   Patient, EPIC    CHIEF COMPLAINT / ID problem:    Chief Complaint   Patient presents with    Follow-up     Aspergillus pna       HISTORY OF PRESENT ILLNESS / Interval history:  Here for follow up on Aspergillus Lung infection and PNA, BAL cx positive for Aspergillus fumigatus and Paecilomyces from 6/11/21. He is on   oral V fend due to his Resp symptoms. 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 He did have +vE Beta D glucan assay back in 2020. He is tolerating (tV fend therapy) without any side effects. Respiratory symptoms resolved. He is using albuterol as needed. No hemoptysis no chest pain no shortness of breath. He had a sleep study now on CPAP. Past Medical History:    Past Medical History:   Diagnosis Date    Asthma     Lung abscess ([address]     PNA (pneumonia)     Pneumonia     Aspergillius fungus    Pneumonia 05/2018       Past Surgical History:    Past Surgical History:   Procedure Laterality Date    BRONCHOSCOPY  2018    BRONCHOSCOPY      BRONCHOSCOPY N/A 6/11/2021    BRONCHOSCOPY WITH BRONCHIOLAR ALVEOLAR LAVAGE performed by Nikky Goddard DO at [address]  6/11/2021    BRONCHOSCOPY DIAGNOSTIC OR CELL [address] ONLY performed by Nikky Goddard DO at [address] EXTRACTION         Current Medications:    Current Outpatient Medications   Medication Sig Dispense Refill    voriconazole (VFEND) 200 MG tablet Take 200 mg by mouth in the morning and 200 mg before bedtime.       albuterol sulfate  (90 Base) MCG/ACT inhaler INHALE TWO PUFFS BY MOUTH EVERY 4 HOURS AS NEEDED FOR WHEEZING OR FOR SHORTNESS OF BREATH 8.5 g 11    SYMBICORT 160-4.5 MCG/ACT stool and abdominal pain  GENITOURINARY:  negative for frequency, dysuria, urinary incontinence, decreased urine volume, and hematuria  HEMATOLOGIC/LYMPHATIC:  negative for easy bruising, bleeding and lymphadenopathy  ALLERGIC/IMMUNOLOGIC:  negative for recurrent infections, angioedema, anaphylaxis and drug reactions  ENDOCRINE:  negative for weight changes and diabetic symptoms including polyuria, polydipsia and polyphagia  MUSCULOSKELETAL:  negative for  pain, joint swelling, decreased range of motion and muscle weakness  INTEGUMENTARY: negative for skin color change, rashes, blisters  NEUROLOGICAL:  negative for headaches, slurred speech, unilateral weakness  PSYCHIATRIC/BEHAVIORAL: negative for hallucinations, behavioral problems, confusion and agitation.      PHYSICAL EXAM:    Vitals:    Vitals:    07/20/22 0841   BP: 121/75   Pulse: 65   Temp: 98.6 °F (37 °C)     General Appearance: alert,in no acute distress, no pallor, no icterus   Skin: warm and dry, no rash or erythema  Head: normocephalic and atraumatic  Eyes: pupils equal, round, and reactive to light, extraocular eye movements intact, conjunctivae normal  ENT: tympanic membrane, external ear and ear canal normal bilaterally, nose without deformity, nasal mucosa and turbinates normal without polyps  Neck: supple and non-tender without mass, no thyromegaly  no cervical lymphadenopathy  Pulmonary/Chest: clear to auscultation bilaterally- no wheezes, rales or rhonchi, normal air movement,  Cardiovascular: normal rate, regular rhythm, normal S1 and S2, no murmurs, rubs, clicks, or gallops,   Abdomen: soft, non-tender, non-distended, normal bowel sounds, no masses or organomegaly  Extremities: no cyanosis, clubbing or edema  Musculoskeletal: normal range of motion, no joint swelling   Integumentary: no petechiae, no purpura, no blisters  Neurologic: reflexes normal and symmetric, no cranial nerve deficit, speech normal   Psych:  Orientation, sensorium, mood normal       DATA:    CBC:   Lab Results   Component Value Date    WBC 6.0 04/21/2022    HGB 13.5 04/21/2022    HCT 39.9 (L) 04/21/2022    MCV 86.4 04/21/2022     04/21/2022     RENAL:   Lab Results   Component Value Date    CREATININE 0.7 (L) 07/20/2022    BUN 19 07/20/2022     07/20/2022    K 4.4 07/20/2022     07/20/2022    CO2 26 07/20/2022     SED RATE:   Lab Results   Component Value Date/Time    SEDRATE 79 06/06/2018 07:03 AM     CK: No results found for: CKTOTAL  CRP:   Lab Results   Component Value Date/Time    CRP 54.6 06/06/2018 07:03 AM     Hepatic Function Panel:   Lab Results   Component Value Date/Time    ALKPHOS 96 07/20/2022 09:36 AM    ALT 28 07/20/2022 09:36 AM    AST 22 07/20/2022 09:36 AM    PROT 7.9 07/20/2022 09:36 AM    BILITOT 0.5 07/20/2022 09:36 AM    BILIDIR <0.2 08/11/2021 09:38 AM    IBILI see below 08/11/2021 09:38 AM    LABALBU 5.0 07/20/2022 09:36 AM     UA:  Lab Results   Component Value Date/Time    COLORU YELLOW 04/26/2019 11:39 AM    CLARITYU Clear 04/26/2019 11:39 AM    GLUCOSEU Negative 04/26/2019 11:39 AM    BILIRUBINUR Negative 04/26/2019 11:39 AM    KETUA Negative 04/26/2019 11:39 AM    SPECGRAV 1.009 04/26/2019 11:39 AM    BLOODU Negative 04/26/2019 11:39 AM    PHUR 7.5 04/26/2019 11:39 AM    PROTEINU Negative 04/26/2019 11:39 AM    UROBILINOGEN 0.2 04/26/2019 11:39 AM    NITRU Negative 04/26/2019 11:39 AM    LEUKOCYTESUR Negative 04/26/2019 11:39 AM    LABMICR Not Indicated 04/26/2019 11:39 AM    URINETYPE Not Specified 04/26/2019 11:39 AM      Urine Microscopic: No results found for: LABCAST, BACTERIA, COMU, HYALCAST, WBCUA, RBCUA, EPIU  Urine Reflex to Culture:   Lab Results   Component Value Date/Time    URRFLXCULT Not Indicated 04/26/2019 11:39 AM       MICRO: cultures reviewed and updated by me     Respiratory Culture:  Lab Results   Component Value Date/Time    CULTRESP 50,000-100,000 CFU/mL Normal respiratory constantino 06/11/2021 08:11 AM    CULTRESP for follow-up today. His main risk factor for PNA appears to be his occupation. We discussed about wearing a mask and taking precautions . PLAN:    Cont oral V fend for one month  CT chest for follow  up  Check Lfts, Beta D glucan   If CT scan looks good will be  able to d/c V fend therapy will check with  his Pulmonary physician if he is ok with this plan   He will see me in  3 months once off therapy to make sure no new issues  Occupational precautions  and wearing mask, avoiding aerosolization d/w pt   Side effects d/w pt             Total time spent for this encounter: 40 min   on visit (including interval history,physical exam, review of data including labs, cultures, imaging,  ordering labs, development and implementation of treatment plan, co ordination of care, counseling and education ). --Alan Riggs MD on 7/23/2022 at 10:43 AM    An electronic signature was used to authenticate this note. D/w Patient in detail at  the time of consultation. Thanks for allowing me to participate in your patient's care and please do not hesitate to  call me with any questions or concerns.     Hayley Hunter MD  Infectious Disease  [facility]) Physician  Phone: [phone]   Fax : [phone]

## 2022-07-21 LAB
(1,3)-BETA-D-GLUCAN (FUNGITELL) INTERPRETATION: NEGATIVE
(1,3)-BETA-D-GLUCAN (FUNGITELL): <31 PG/ML

## 2022-07-23 LAB
ASPERGILLUS GALACTO AG: NEGATIVE
ASPERGILLUS GALACTO INDEX: 0.05

## 2022-07-26 ENCOUNTER — HOSPITAL ENCOUNTER (OUTPATIENT)
Dept: CT IMAGING | Age: 32
Discharge: HOME | End: 2022-07-26
Payer: COMMERCIAL

## 2022-07-26 ENCOUNTER — TELEPHONE (OUTPATIENT)
Dept: INFECTIOUS DISEASES | Age: 32
End: 2022-07-26

## 2022-07-26 DIAGNOSIS — B44.9 ASPERGILLUS PNEUMONIA (HCC): ICD-10-CM

## 2022-07-26 PROCEDURE — 71250 CT THORAX DX C-: CPT

## 2022-07-26 NOTE — TELEPHONE ENCOUNTER
Ct CHEST looks good and Blood work looks good    Ok to stop V fend I will CC to Joss Yordan so he is ok with the plan    Thx                 HISTORY:   ORDERING SYSTEM PROVIDED HISTORY: Aspergillus pneumonia ([address]   TECHNOLOGIST PROVIDED HISTORY:   Reason for Exam: pt denies any complaints, pt is here due to having   Aspergillus pneumonia  in 2018   Relevant Medical/Surgical History: Asthma, Lung Abscess, Aspergillus   Pneumonia, Bronchoscopes       FINDINGS:   Mediastinum:  The central airways are clear. No evidence of mediastinal,   hilar or axillary lymphadenopathy. Aorta is normal in caliber without acute   abnormality. Heart and pericardium are unremarkable. The pulmonary artery is   normal in caliber. Lungs/pleura:  Lungs are clear without focal consolidation or pulmonary   edema. No pleural effusion or pneumothorax. Small right lower lobe   calcified granuloma. Upper Abdomen:  Limited images of the upper abdomen demonstrate no acute   abnormality. Soft Tissues/Bones:  No acute abnormality of the visualized osseous   structures. Visualized soft tissues are unremarkable. Impression   No acute pulmonary abnormality.

## 2022-11-16 ENCOUNTER — OFFICE VISIT (OUTPATIENT)
Dept: INFECTIOUS DISEASES | Age: 32
End: 2022-11-16
Payer: COMMERCIAL

## 2022-11-16 VITALS
OXYGEN SATURATION: 100 % | BODY MASS INDEX: 23.87 KG/M2 | SYSTOLIC BLOOD PRESSURE: 122 MMHG | WEIGHT: 186 LBS | HEIGHT: 74 IN | TEMPERATURE: 97.7 F | DIASTOLIC BLOOD PRESSURE: 70 MMHG | HEART RATE: 70 BPM

## 2022-11-16 DIAGNOSIS — R59.0 MEDIASTINAL ADENOPATHY: ICD-10-CM

## 2022-11-16 DIAGNOSIS — J18.9 RECURRENT PNEUMONIA: ICD-10-CM

## 2022-11-16 DIAGNOSIS — B44.9 ASPERGILLUS PNEUMONIA (HCC): Primary | ICD-10-CM

## 2022-11-16 PROCEDURE — 99214 OFFICE O/P EST MOD 30 MIN: CPT | Performed by: INTERNAL MEDICINE

## 2022-11-16 NOTE — PROGRESS NOTES
Infectious Diseases Outpatient Follow-up Note         Primary Care Physician:  Saida Anaya MD       History Obtained From:   Patient, EPIC    CHIEF COMPLAINT / ID problem:    Chief Complaint   Patient presents with    Follow-up     Aspergillus pna       HISTORY OF PRESENT ILLNESS / Interval history:   Here for follow up on Aspergillus Lung infection and PNA, BAL cx positive for Aspergillus fumigatus and Paecilomyces from 6/11/21. He was on  oral V fend due to his Resp symptoms. 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 He did have +vE Beta D glucan assay back in 2020.     He finished the V fend course and no new resp symptoms has been diagnosed with sleep apnea and using CPAP intermittently CT chest in follow up no new issues using inhaler PRN NO Fever no chills no hemoptysis , his wife was with him in the clinic today -         Past Medical History:    Past Medical History:   Diagnosis Date    Asthma     Lung abscess ([address]     PNA (pneumonia)     Pneumonia     Aspergillius fungus    Pneumonia 05/2018       Past Surgical History:    Past Surgical History:   Procedure Laterality Date    BRONCHOSCOPY  2018    BRONCHOSCOPY      BRONCHOSCOPY N/A 6/11/2021    BRONCHOSCOPY WITH BRONCHIOLAR ALVEOLAR LAVAGE performed by Branden Causey DO at [address]  6/11/2021    BRONCHOSCOPY DIAGNOSTIC OR CELL [address] ONLY performed by Branden Causey DO at [address] EXTRACTION         Current Medications:    Current Outpatient Medications   Medication Sig Dispense Refill    albuterol sulfate  (90 Base) MCG/ACT inhaler INHALE TWO PUFFS BY MOUTH EVERY 4 HOURS AS NEEDED FOR WHEEZING OR FOR SHORTNESS OF BREATH 8.5 g 11    voriconazole (VFEND) 200 MG tablet Take 200 mg by mouth in the morning and 200 mg before bedtime. (Patient not taking: Reported on 11/16/2022)      SYMBICORT 160-4.5 MCG/ACT AERO Inhale 2 puffs into the lungs 2 times daily (Patient not taking: No sig reported) 1 each 11    fluticasone (ARNUITY ELLIPTA) 100 MCG/ACT AEPB Inhale 1 puff into the lungs daily (Patient not taking: No sig reported) 1 each 5     No current facility-administered medications for this visit. Allergies:  Singulair [montelukast sodium], Tiotropium bromide monohydrate, and Vancomycin      Immunizations :   Immunization History   Administered Date(s) Administered    COVID-19, PFIZER PURPLE top, DILUTE for use, (age 15 y+), 30mcg/0.3mL 04/15/2021, 05/13/2021    PPD Test 06/05/2018    Pneumococcal Conjugate 13-valent Reita Guess) 03/09/2022           Social History:      Social History     Socioeconomic History    Marital status:    Tobacco Use    Smoking status: Never    Smokeless tobacco: Never   Vaping Use    Vaping Use: Never used   Substance and Sexual Activity    Alcohol use: Yes     Comment: rarely    Drug use: Never    Sexual activity: Yes     Partners: Female     Social History     Tobacco Use   Smoking Status Never   Smokeless Tobacco Never      Family History   Problem Relation Age of Onset    No Known Problems Mother     No Known Problems Father     No Known Problems Sister     No Known Problems Brother     No Known Problems Sister         REVIEW OF SYSTEMS:      CONSTITUTIONAL:  negative for fevers, chills, diaphoresis, activity change, appetite change, fatigue, night sweats and unexpected weight change.    EYES:  negative for blurred vision, eye discharge, visual disturbance and icterus  HEENT:  negative for hearing loss, tinnitus, ear drainage, sinus pressure, nasal congestion, epistaxis and snoring  RESPIRATORY:  No cough , no sob, no sputum production , no wheeze ,no hemoptysis   CARDIOVASCULAR:  negative for chest pain, palpitations, exertional chest pressure/discomfort, edema, syncope  GASTROINTESTINAL: negative for nausea, vomiting, diarrhea, constipation, blood in stool and abdominal pain  GENITOURINARY:  negative for frequency, dysuria, urinary incontinence, decreased urine volume, and hematuria  HEMATOLOGIC/LYMPHATIC:  negative for easy bruising, bleeding and lymphadenopathy  ALLERGIC/IMMUNOLOGIC:  negative for recurrent infections, angioedema, anaphylaxis and drug reactions  ENDOCRINE:  negative for weight changes and diabetic symptoms including polyuria, polydipsia and polyphagia  MUSCULOSKELETAL:  negative for  pain, joint swelling, decreased range of motion and muscle weakness  INTEGUMENTARY: negative for skin color change, rashes, blisters  NEUROLOGICAL:  negative for headaches, slurred speech, unilateral weakness  PSYCHIATRIC/BEHAVIORAL: negative for hallucinations, behavioral problems, confusion and agitation.      PHYSICAL EXAM:    Vitals:    Vitals:    11/16/22 0855   BP: 122/70   Pulse: 70   Temp: 97.7 °F (36.5 °C)   SpO2: 100%     General Appearance: alert,in no acute distress, no pallor, no icterus   Skin: warm and dry, no rash or erythema  Head: normocephalic and atraumatic  Eyes: pupils equal, round, and reactive to light, extraocular eye movements intact, conjunctivae normal  ENT: tympanic membrane, external ear and ear canal normal bilaterally, nose without deformity, nasal mucosa and turbinates normal without polyps  Neck: supple and non-tender without mass, no thyromegaly  no cervical lymphadenopathy  Pulmonary/Chest: clear to auscultation bilaterally- no wheezes, rales or rhonchi, normal air movement,  Cardiovascular: normal rate, regular rhythm, normal S1 and S2, no murmurs, rubs, clicks, or gallops,   Abdomen: soft, non-tender, non-distended, normal bowel sounds, no masses or organomegaly  Extremities: no cyanosis, clubbing or edema  Musculoskeletal: normal range of motion, no joint swelling   Integumentary: no petechiae, no purpura, no blisters  Neurologic: reflexes normal and symmetric, no cranial nerve deficit, speech normal   Psych:  Orientation, sensorium, mood normal       DATA:    CBC:   Lab Results   Component Value Date    WBC 6.0 04/21/2022    HGB 13.5 04/21/2022    HCT 39.9 (L) 04/21/2022    MCV 86.4 04/21/2022     04/21/2022     RENAL:   Lab Results   Component Value Date    CREATININE 0.7 (L) 07/20/2022    BUN 19 07/20/2022     07/20/2022    K 4.4 07/20/2022     07/20/2022    CO2 26 07/20/2022     SED RATE:   Lab Results   Component Value Date/Time    SEDRATE 79 06/06/2018 07:03 AM     CK: No results found for: CKTOTAL  CRP:   Lab Results   Component Value Date/Time    CRP 54.6 06/06/2018 07:03 AM     Hepatic Function Panel:   Lab Results   Component Value Date/Time    ALKPHOS 96 07/20/2022 09:36 AM    ALT 28 07/20/2022 09:36 AM    AST 22 07/20/2022 09:36 AM    PROT 7.9 07/20/2022 09:36 AM    BILITOT 0.5 07/20/2022 09:36 AM    BILIDIR <0.2 08/11/2021 09:38 AM    IBILI see below 08/11/2021 09:38 AM    LABALBU 5.0 07/20/2022 09:36 AM     UA:  Lab Results   Component Value Date/Time    COLORU YELLOW 04/26/2019 11:39 AM    CLARITYU Clear 04/26/2019 11:39 AM    GLUCOSEU Negative 04/26/2019 11:39 AM    BILIRUBINUR Negative 04/26/2019 11:39 AM    KETUA Negative 04/26/2019 11:39 AM    SPECGRAV 1.009 04/26/2019 11:39 AM    BLOODU Negative 04/26/2019 11:39 AM    PHUR 7.5 04/26/2019 11:39 AM    PROTEINU Negative 04/26/2019 11:39 AM    UROBILINOGEN 0.2 04/26/2019 11:39 AM    NITRU Negative 04/26/2019 11:39 AM    LEUKOCYTESUR Negative 04/26/2019 11:39 AM    LABMICR Not Indicated 04/26/2019 11:39 AM    URINETYPE Not Specified 04/26/2019 11:39 AM      Urine Microscopic: No results found for: LABCAST, BACTERIA, COMU, HYALCAST, WBCUA, RBCUA, EPIU  Urine Reflex to Culture:   Lab Results   Component Value Date/Time    URRFLXCULT Not Indicated 04/26/2019 11:39 AM       MICRO: cultures reviewed and updated by me     Respiratory Culture:  Lab Results   Component Value Date/Time    CULTRESP 50,000-100,000 CFU/mL Normal respiratory constantino 06/11/2021 08:11 AM    CULTRESP  06/11/2021 08:11 AM     50,000 to 100,000 CFU/mL  Beta Lactamase Negative  Sensitivities not routinely performed. Drugs of choice are:  Ampicillin, Ceftriaxone or Trimethoprim/Sulfamethoxazole. LABGRAM  06/11/2021 08:11 AM     No Epithelial Cells seen  1+ WBC's (Polymorphonuclear)  No organisms seen       AFB:  Lab Results   Component Value Date/Time    AFBSMEAR No AFB observed by Fluorescent stain 06/11/2021 08:11 AM       Urine Culture: No results for input(s): LABURIN in the last 72 hours. Imaging:  No orders to display     TECHNOLOGIST PROVIDED HISTORY:   Reason for Exam: pt denies any complaints, pt is here due to having   Aspergillus pneumonia  in 2018   Relevant Medical/Surgical History: Asthma, Lung Abscess, Aspergillus   Pneumonia, Bronchoscopes       FINDINGS:   Mediastinum:  The central airways are clear. No evidence of mediastinal,   hilar or axillary lymphadenopathy. Aorta is normal in caliber without acute   abnormality. Heart and pericardium are unremarkable. The pulmonary artery is   normal in caliber. Lungs/pleura:  Lungs are clear without focal consolidation or pulmonary   edema. No pleural effusion or pneumothorax. Small right lower lobe   calcified granuloma. Upper Abdomen:  Limited images of the upper abdomen demonstrate no acute   abnormality. Soft Tissues/Bones:  No acute abnormality of the visualized osseous   structures. Visualized soft tissues are unremarkable. Impression   No acute pulmonary abnormality. Labs, Microbiology and  Radiology results pertinent to this visit and from care every where  reviewed in detail by me as part of the consultation     IMPRESSION:     Diagnosis Orders   1. Aspergillus pneumonia ([address]        2. Recurrent pneumonia        3.  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 He was advised to call with any new symptoms other wise for now ok to observe him off therapy       PLAN:    Cont to observe off V fend therapy   CT chest images and report reviewed with patient  Watch for any new symptoms   Follow up with Yi Taylor as planned  Follow resp precautions at work place   Follow up PRN        Total time spent for this encounter: 35 MIN   on visit (including interval history,physical exam, review of data including labs, cultures, imaging,  ordering labs, development and implementation of treatment plan, co ordination of care, counseling and education ). --Estefany Rose MD on 11/19/2022 at 11:46 AM    An electronic signature was used to authenticate this note. D/w Patient in detail at  the time of consultation. Thanks for allowing me to participate in your patient's care and please do not hesitate to  call me with any questions or concerns.     Sarah De La Paz MD  Infectious Disease  [facility]) Physician  Phone: [phone]   Fax : [phone]

## 2022-12-05 ENCOUNTER — OFFICE VISIT (OUTPATIENT)
Dept: PULMONOLOGY | Age: 32
End: 2022-12-05
Payer: COMMERCIAL

## 2022-12-05 VITALS
SYSTOLIC BLOOD PRESSURE: 120 MMHG | HEIGHT: 74 IN | TEMPERATURE: 97.1 F | DIASTOLIC BLOOD PRESSURE: 80 MMHG | OXYGEN SATURATION: 98 % | RESPIRATION RATE: 21 BRPM | BODY MASS INDEX: 24.05 KG/M2 | HEART RATE: 70 BPM | WEIGHT: 187.4 LBS

## 2022-12-05 DIAGNOSIS — Z99.89 OSA ON CPAP: Primary | ICD-10-CM

## 2022-12-05 DIAGNOSIS — G47.33 OSA ON CPAP: Primary | ICD-10-CM

## 2022-12-05 DIAGNOSIS — B44.9 ASPERGILLUS PNEUMONIA (HCC): ICD-10-CM

## 2022-12-05 DIAGNOSIS — J45.20 MILD INTERMITTENT ASTHMA WITHOUT COMPLICATION: ICD-10-CM

## 2022-12-05 PROCEDURE — 99214 OFFICE O/P EST MOD 30 MIN: CPT | Performed by: INTERNAL MEDICINE

## 2022-12-05 ASSESSMENT — SLEEP AND FATIGUE QUESTIONNAIRES
HOW LIKELY ARE YOU TO NOD OFF OR FALL ASLEEP WHILE SITTING AND READING: 1
HOW LIKELY ARE YOU TO NOD OFF OR FALL ASLEEP WHILE SITTING AND TALKING TO SOMEONE: 1
HOW LIKELY ARE YOU TO NOD OFF OR FALL ASLEEP IN A CAR, WHILE STOPPED FOR A FEW MINUTES IN TRAFFIC: 0
HOW LIKELY ARE YOU TO NOD OFF OR FALL ASLEEP WHEN YOU ARE A PASSENGER IN A CAR FOR AN HOUR WITHOUT A BREAK: 1
ESS TOTAL SCORE: 8
HOW LIKELY ARE YOU TO NOD OFF OR FALL ASLEEP WHILE SITTING INACTIVE IN A PUBLIC PLACE: 1
HOW LIKELY ARE YOU TO NOD OFF OR FALL ASLEEP WHILE SITTING QUIETLY AFTER LUNCH WITHOUT ALCOHOL: 1
HOW LIKELY ARE YOU TO NOD OFF OR FALL ASLEEP WHILE LYING DOWN TO REST IN THE AFTERNOON WHEN CIRCUMSTANCES PERMIT: 1
HOW LIKELY ARE YOU TO NOD OFF OR FALL ASLEEP WHILE WATCHING TV: 2

## 2022-12-05 NOTE — PROGRESS NOTES
Chief complaint  This is a 34y.o. year old male  who comes to see me with a chief complaint of   Chief Complaint   Patient presents with    Follow-up     1 year MARTÍNEZ       HPI  Here with a cc of asthma. MARTÍNEZ    HST back in March with AHI of 7    Machine:  Patient is using a APAP machine. Average usage is 2 hrs 52min 00 sec. He is meeting 4 or more hours per night 20% of the time. Average AHI is 2.9. Patient admits to taking the mask off at night. Says he puts the nasal mask on at night and is fine. At some point during the night he takes it off. He is not sure why but it could be related to him being a mouth breather. He does say he feels less tired when he uses the machine. Some nights he can get 5 or more hours and he can really tell the difference the next day. Down to just using albuterol. Off steroid inhalers. Also off vfend for prolonged aspergillus pneumonia  . Sleep Medicine 12/5/2022 3/9/2022   Sitting and reading 1 3   Watching TV 2 3   Sitting, inactive in a public place (e.g. a theatre or a meeting) 1 2   As a passenger in a car for an hour without a break 1 2   Lying down to rest in the afternoon when circumstances permit 1 3   Sitting and talking to someone 1 3   Sitting quietly after a lunch without alcohol 1 2   In a car, while stopped for a few minutes in traffic 0 1   [place] Sleepiness Score 8 19   Neck circumference (Inches) - 15         Current Outpatient Medications:     albuterol sulfate  (90 Base) MCG/ACT inhaler, INHALE TWO PUFFS BY MOUTH EVERY 4 HOURS AS NEEDED FOR WHEEZING OR FOR SHORTNESS OF BREATH, Disp: 8.5 g, Rfl: 11    voriconazole (VFEND) 200 MG tablet, Take 200 mg by mouth in the morning and 200 mg before bedtime.  (Patient not taking: No sig reported), Disp: , Rfl:     SYMBICORT 160-4.5 MCG/ACT AERO, Inhale 2 puffs into the lungs 2 times daily (Patient not taking: No sig reported), Disp: 1 each, Rfl: 11    fluticasone (ARNUITY ELLIPTA) 100 MCG/ACT AEPB, Inhale 1 puff into the lungs daily (Patient not taking: No sig reported), Disp: 1 each, Rfl: 5        PHYSICAL EXAM:  GENERAL:  Well nourished, alert, appears stated age, no distress, flat affect   HEENT:  No scleral icterus, no conjunctival irritation. Mallampati IV   NECK:  No thyromegaly, no bruits  LYMPH:  No cervical or supraclavicular adenopathy  HEART:  Regular rate and rhythm, no murmurs  LUNGS:  Essentially clear   ABDOMEN:  No distention, no organomegaly  EXTREMITIES:  No edema, no digital clubbing  NEURO:  No localizing deficits, CN II-XII intact    Pulmonary Function Testing 2/22  PFT does not demonstrates obstruction with FEV1 of 88 %  FVC of 78%  without bronchodilator response. Normal lung volume without air trapping or hyperinflation Diffusion capacity is normal  This is consistent with normal PFT. Assessment/Plan:  1. MARTÍNEZ on CPAP  Benefiting from therapy by a low [place] score, good energy levels, low fatigue, and control of chronic medical problems    He is not exactly compliant which may be related to him being a mouth breather. Will ask for full face mask and chip strap. He can try the chin strap with his nasal mask or try full face mask. It is helping him to feel less tired    2. Mild intermittent asthma without complication  Controlled. Likely more controlled now that #3 has resolved    3. Aspergillus pneumonia ([address]  Per ID. Off Vfend.   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

## 2022-12-05 NOTE — PATIENT INSTRUCTIONS
Will order full face mask and chin trip to [address] [place] [address] in [place]     Use albuterol as needed    Follow up in 6 months

## 2022-12-25 ENCOUNTER — APPOINTMENT (OUTPATIENT)
Dept: GENERAL RADIOLOGY | Age: 32
End: 2022-12-25
Payer: COMMERCIAL

## 2022-12-25 ENCOUNTER — APPOINTMENT (OUTPATIENT)
Dept: CT IMAGING | Age: 32
End: 2022-12-25
Payer: COMMERCIAL

## 2022-12-25 ENCOUNTER — HOSPITAL ENCOUNTER (EMERGENCY)
Age: 32
Discharge: HOME | End: 2022-12-25
Attending: EMERGENCY MEDICINE
Payer: COMMERCIAL

## 2022-12-25 VITALS
HEART RATE: 84 BPM | WEIGHT: 180.12 LBS | OXYGEN SATURATION: 96 % | TEMPERATURE: 97.8 F | BODY MASS INDEX: 23.13 KG/M2 | RESPIRATION RATE: 22 BRPM | SYSTOLIC BLOOD PRESSURE: 107 MMHG | DIASTOLIC BLOOD PRESSURE: 66 MMHG

## 2022-12-25 DIAGNOSIS — J18.9 PNEUMONIA OF BOTH LUNGS DUE TO INFECTIOUS ORGANISM, UNSPECIFIED PART OF LUNG: ICD-10-CM

## 2022-12-25 DIAGNOSIS — E87.6 HYPOKALEMIA: ICD-10-CM

## 2022-12-25 DIAGNOSIS — J10.1 INFLUENZA A: Primary | ICD-10-CM

## 2022-12-25 DIAGNOSIS — R31.9 HEMATURIA, UNSPECIFIED TYPE: ICD-10-CM

## 2022-12-25 LAB
A/G RATIO: 1.3 (ref 1.1–2.2)
ALBUMIN SERPL-MCNC: 4 G/DL (ref 3.4–5)
ALP BLD-CCNC: 87 U/L (ref 40–129)
ALT SERPL-CCNC: 38 U/L (ref 10–40)
ANION GAP SERPL CALCULATED.3IONS-SCNC: 10 MMOL/L (ref 3–16)
AST SERPL-CCNC: 42 U/L (ref 15–37)
BACTERIA: ABNORMAL /HPF
BASOPHILS ABSOLUTE: 0 K/UL (ref 0–0.2)
BASOPHILS RELATIVE PERCENT: 0.1 %
BILIRUB SERPL-MCNC: 0.5 MG/DL (ref 0–1)
BILIRUBIN URINE: NEGATIVE
BLOOD, URINE: ABNORMAL
BUN BLDV-MCNC: 14 MG/DL (ref 7–20)
CALCIUM SERPL-MCNC: 8.8 MG/DL (ref 8.3–10.6)
CHLORIDE BLD-SCNC: 97 MMOL/L (ref 99–110)
CLARITY: CLEAR
CO2: 25 MMOL/L (ref 21–32)
COLOR: ABNORMAL
CREAT SERPL-MCNC: 0.8 MG/DL (ref 0.9–1.3)
EOSINOPHILS ABSOLUTE: 0 K/UL (ref 0–0.6)
EOSINOPHILS RELATIVE PERCENT: 0.1 %
EPITHELIAL CELLS, UA: 0 /HPF (ref 0–5)
GFR SERPL CREATININE-BSD FRML MDRD: >60 ML/MIN/{1.73_M2}
GLUCOSE BLD-MCNC: 119 MG/DL (ref 70–99)
GLUCOSE URINE: NEGATIVE MG/DL
HCT VFR BLD CALC: 40.7 % (ref 40.5–52.5)
HEMOGLOBIN: 13.6 G/DL (ref 13.5–17.5)
HYALINE CASTS: 0 /LPF (ref 0–8)
KETONES, URINE: ABNORMAL MG/DL
LACTIC ACID, SEPSIS: 1.2 MMOL/L (ref 0.4–1.9)
LEUKOCYTE ESTERASE, URINE: NEGATIVE
LIPASE: 25 U/L (ref 13–60)
LYMPHOCYTES ABSOLUTE: 0.3 K/UL (ref 1–5.1)
LYMPHOCYTES RELATIVE PERCENT: 4.3 %
MAGNESIUM: 1.8 MG/DL (ref 1.8–2.4)
MCH RBC QN AUTO: 29.1 PG (ref 26–34)
MCHC RBC AUTO-ENTMCNC: 33.5 G/DL (ref 31–36)
MCV RBC AUTO: 86.8 FL (ref 80–100)
MICROSCOPIC EXAMINATION: YES
MONOCYTES ABSOLUTE: 0.6 K/UL (ref 0–1.3)
MONOCYTES RELATIVE PERCENT: 8.9 %
NEUTROPHILS ABSOLUTE: 5.7 K/UL (ref 1.7–7.7)
NEUTROPHILS RELATIVE PERCENT: 86.6 %
NITRITE, URINE: NEGATIVE
ORGANISM: ABNORMAL
PDW BLD-RTO: 12.3 % (ref 12.4–15.4)
PH UA: 8 (ref 5–8)
PLATELET # BLD: 179 K/UL (ref 135–450)
PMV BLD AUTO: 8.3 FL (ref 5–10.5)
POTASSIUM REFLEX MAGNESIUM: 3.3 MMOL/L (ref 3.5–5.1)
PROTEIN UA: 30 MG/DL
RAPID INFLUENZA  B AGN: NEGATIVE
RAPID INFLUENZA A AGN: NEGATIVE
RBC # BLD: 4.68 M/UL (ref 4.2–5.9)
RBC UA: 33 /HPF (ref 0–4)
REPORT: NORMAL
RESPIRATORY PANEL PCR: ABNORMAL
SARS-COV-2, NAAT: NOT DETECTED
SODIUM BLD-SCNC: 132 MMOL/L (ref 136–145)
SPECIFIC GRAVITY UA: 1.02 (ref 1–1.03)
TOTAL PROTEIN: 7.2 G/DL (ref 6.4–8.2)
URINE REFLEX TO CULTURE: ABNORMAL
URINE TYPE: ABNORMAL
UROBILINOGEN, URINE: 1 E.U./DL
WBC # BLD: 6.6 K/UL (ref 4–11)
WBC UA: 0 /HPF (ref 0–5)

## 2022-12-25 PROCEDURE — 83735 ASSAY OF MAGNESIUM: CPT

## 2022-12-25 PROCEDURE — 80053 COMPREHEN METABOLIC PANEL: CPT

## 2022-12-25 PROCEDURE — 6360000002 HC RX W HCPCS

## 2022-12-25 PROCEDURE — 71046 X-RAY EXAM CHEST 2 VIEWS: CPT

## 2022-12-25 PROCEDURE — 99285 EMERGENCY DEPT VISIT HI MDM: CPT

## 2022-12-25 PROCEDURE — 74177 CT ABD & PELVIS W/CONTRAST: CPT

## 2022-12-25 PROCEDURE — 6370000000 HC RX 637 (ALT 250 FOR IP)

## 2022-12-25 PROCEDURE — 87635 SARS-COV-2 COVID-19 AMP PRB: CPT

## 2022-12-25 PROCEDURE — 87804 INFLUENZA ASSAY W/OPTIC: CPT

## 2022-12-25 PROCEDURE — 6360000004 HC RX CONTRAST MEDICATION: Performed by: EMERGENCY MEDICINE

## 2022-12-25 PROCEDURE — 85025 COMPLETE CBC W/AUTO DIFF WBC: CPT

## 2022-12-25 PROCEDURE — 87040 BLOOD CULTURE FOR BACTERIA: CPT

## 2022-12-25 PROCEDURE — 2580000003 HC RX 258

## 2022-12-25 PROCEDURE — 96367 TX/PROPH/DG ADDL SEQ IV INF: CPT

## 2022-12-25 PROCEDURE — 0202U NFCT DS 22 TRGT SARS-COV-2: CPT

## 2022-12-25 PROCEDURE — 83690 ASSAY OF LIPASE: CPT

## 2022-12-25 PROCEDURE — 96365 THER/PROPH/DIAG IV INF INIT: CPT

## 2022-12-25 PROCEDURE — 83605 ASSAY OF LACTIC ACID: CPT

## 2022-12-25 PROCEDURE — 81001 URINALYSIS AUTO W/SCOPE: CPT

## 2022-12-25 RX ORDER — OSELTAMIVIR PHOSPHATE 75 MG/1
75 CAPSULE ORAL 2 TIMES DAILY
Qty: 10 CAPSULE | Refills: 0 | Status: SHIPPED | OUTPATIENT
Start: 2022-12-25 | End: 2022-12-30

## 2022-12-25 RX ORDER — CEFUROXIME AXETIL 500 MG/1
500 TABLET ORAL 2 TIMES DAILY
Qty: 14 TABLET | Refills: 0 | Status: SHIPPED | OUTPATIENT
Start: 2022-12-25 | End: 2022-12-25 | Stop reason: ALTCHOICE

## 2022-12-25 RX ORDER — ACETAMINOPHEN 500 MG
1000 TABLET ORAL ONCE
Status: COMPLETED | OUTPATIENT
Start: 2022-12-25 | End: 2022-12-25

## 2022-12-25 RX ORDER — TAMSULOSIN HYDROCHLORIDE 0.4 MG/1
0.4 CAPSULE ORAL DAILY
Qty: 30 CAPSULE | Refills: 0 | Status: SHIPPED | OUTPATIENT
Start: 2022-12-25

## 2022-12-25 RX ORDER — SODIUM CHLORIDE, SODIUM LACTATE, POTASSIUM CHLORIDE, AND CALCIUM CHLORIDE .6; .31; .03; .02 G/100ML; G/100ML; G/100ML; G/100ML
30 INJECTION, SOLUTION INTRAVENOUS ONCE
Status: COMPLETED | OUTPATIENT
Start: 2022-12-25 | End: 2022-12-25

## 2022-12-25 RX ORDER — AZITHROMYCIN 250 MG/1
250 TABLET, FILM COATED ORAL SEE ADMIN INSTRUCTIONS
Qty: 6 TABLET | Refills: 0 | Status: SHIPPED | OUTPATIENT
Start: 2022-12-25 | End: 2022-12-25 | Stop reason: ALTCHOICE

## 2022-12-25 RX ORDER — ONDANSETRON 4 MG/1
4 TABLET, FILM COATED ORAL EVERY 8 HOURS PRN
Qty: 20 TABLET | Refills: 0 | Status: SHIPPED | OUTPATIENT
Start: 2022-12-25

## 2022-12-25 RX ORDER — LIDOCAINE HYDROCHLORIDE 20 MG/ML
JELLY TOPICAL PRN
Status: DISCONTINUED | OUTPATIENT
Start: 2022-12-25 | End: 2022-12-25 | Stop reason: HOSPADM

## 2022-12-25 RX ADMIN — CEFTRIAXONE 1000 MG: 1 INJECTION, POWDER, FOR SOLUTION INTRAMUSCULAR; INTRAVENOUS at 09:39

## 2022-12-25 RX ADMIN — IOPAMIDOL 75 ML: 755 INJECTION, SOLUTION INTRAVENOUS at 11:15

## 2022-12-25 RX ADMIN — AZITHROMYCIN DIHYDRATE 500 MG: 500 INJECTION, POWDER, LYOPHILIZED, FOR SOLUTION INTRAVENOUS at 10:20

## 2022-12-25 RX ADMIN — ACETAMINOPHEN 1000 MG: 500 TABLET ORAL at 07:48

## 2022-12-25 RX ADMIN — POTASSIUM BICARBONATE 40 MEQ: 782 TABLET, EFFERVESCENT ORAL at 09:40

## 2022-12-25 RX ADMIN — SODIUM CHLORIDE, POTASSIUM CHLORIDE, SODIUM LACTATE AND CALCIUM CHLORIDE 2500 ML: 600; 310; 30; 20 INJECTION, SOLUTION INTRAVENOUS at 09:38

## 2022-12-25 ASSESSMENT — PAIN - FUNCTIONAL ASSESSMENT: PAIN_FUNCTIONAL_ASSESSMENT: NONE - DENIES PAIN

## 2022-12-25 ASSESSMENT — PAIN SCALES - GENERAL: PAINLEVEL_OUTOF10: 6

## 2022-12-25 NOTE — ED PROVIDER NOTES
[facility]        Pt Name: Mamei Raymond  MRN: [id number]  [place] 1990  Date of evaluation: 12/25/2022  Provider: MELI Bill - CNP  PCP: Dinah Kumar MD  Note Started: 7:26 AM EST12/28/2022        I have seen and evaluated this patient with my supervising physician Yoanna Cotto MD.      [address]       Chief Complaint   Patient presents with    Nausea     Pt states for 2 days nausea and vomiting, pt states that he has had decreased urine output as well, pt febrile upon arrival,     Emesis         HISTORY OF PRESENT ILLNESS   (Location/Symptom, Timing/Onset, Context/Setting, Quality, Duration, Modifying Factors, Severity)  Note limiting factors. Chief Complaint: above    Mamie Raymond is a 34 y.o. male who presents to ED with n/v, generalized abd pain, decreased urine output, fevers, chills, myalgias x 2 days. 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 Finished abx Mid November. 10lb wt loss since mid last week when symptoms began. No relief with home medication so came in to ED. Nursing Notes were all reviewed and agreed with or any disagreements were addressed in the HPI. REVIEW OF SYSTEMS    (2-9 systems for level 4, 10 or more for level 5)     Review of Systems   Constitutional:  Positive for activity change, appetite change, chills and fever. Negative for diaphoresis. HENT:  Positive for rhinorrhea and sore throat. Negative for congestion, sinus pressure and trouble swallowing. Eyes:  Negative for pain and visual disturbance. Respiratory:  Negative for cough and shortness of breath. Cardiovascular:  Negative for chest pain and leg swelling. Gastrointestinal:  Positive for diarrhea. Negative for abdominal pain, constipation, nausea and vomiting. Genitourinary:  Positive for testicular pain.  Negative for dysuria, frequency, hematuria, penile discharge, penile pain, penile swelling and scrotal swelling. Musculoskeletal:  Negative for back pain and neck pain. Skin:  Negative for rash and wound. Neurological:  Negative for dizziness and light-headedness. PAST MEDICAL HISTORY     Past Medical History:   Diagnosis Date    Asthma     Lung abscess ([address]     PNA (pneumonia)     Pneumonia     Aspergillius fungus    Pneumonia 05/2018       SURGICAL HISTORY     Past Surgical History:   Procedure Laterality Date    BRONCHOSCOPY  2018    BRONCHOSCOPY      BRONCHOSCOPY N/A 6/11/2021    BRONCHOSCOPY WITH BRONCHIOLAR ALVEOLAR LAVAGE performed by Tremaine Morrison DO at [address]  6/11/2021    BRONCHOSCOPY DIAGNOSTIC OR CELL [address] ONLY performed by Tremaine Morrison DO at [address] [place] [address]       Discharge Medication List as of 12/25/2022 12:17 PM        CONTINUE these medications which have NOT CHANGED    Details   voriconazole (VFEND) 200 MG tablet Take 200 mg by mouth in the morning and 200 mg before bedtime. Historical Med      albuterol sulfate  (90 Base) MCG/ACT inhaler INHALE TWO PUFFS BY MOUTH EVERY 4 HOURS AS NEEDED FOR WHEEZING OR FOR SHORTNESS OF BREATH, Disp-8.5 g, R-11Normal             ALLERGIES     Singulair [montelukast sodium], Tiotropium bromide monohydrate, and Vancomycin    FAMILYHISTORY       Family History   Problem Relation Age of Onset    No Known Problems Mother     No Known Problems Father     No Known Problems Sister     No Known Problems Brother     No Known Problems Sister         SOCIAL HISTORY       Social History     Socioeconomic History    Marital status:    Tobacco Use    Smoking status: Never    Smokeless tobacco: Never   Vaping Use    Vaping Use: Never used   Substance and Sexual Activity    Alcohol use: Yes     Comment: rarely    Drug use: Never    Sexual activity: Yes     Partners: Female       SCREENINGS        Leann Coma Scale  Eye Opening: Spontaneous  Best Verbal Response: Oriented  Best Motor Response: Obeys commands  [place] Coma Scale Score: 15                CIWA Assessment  BP: 107/66  Heart Rate: 84             PHYSICAL EXAM    (up to 7 for level 4, 8 or more for level 5)     ED Triage Vitals   BP Temp Temp src Pulse Resp SpO2 Height Weight   -- -- -- -- -- -- -- --       Physical Exam  Vitals and nursing note reviewed. Exam conducted with a chaperone present. Constitutional:       General: He is not in acute distress. Appearance: Normal appearance. He is normal weight. He is not ill-appearing or diaphoretic. HENT:      Head: Normocephalic and atraumatic. Right Ear: External ear normal.      Left Ear: External ear normal.      Nose: Nose normal. No congestion or rhinorrhea. Mouth/Throat:      Mouth: Mucous membranes are moist.      Pharynx: Oropharynx is clear. No oropharyngeal exudate or posterior oropharyngeal erythema. Eyes:      General: No scleral icterus. Right eye: No discharge. Left eye: No discharge. Extraocular Movements: Extraocular movements intact. Conjunctiva/sclera: Conjunctivae normal.      Pupils: Pupils are equal, round, and reactive to light. Cardiovascular:      Rate and Rhythm: Normal rate and regular rhythm. Pulses: Normal pulses. Heart sounds: Normal heart sounds. No murmur heard. No friction rub. No gallop. Pulmonary:      Effort: Pulmonary effort is normal. No respiratory distress. Breath sounds: Normal breath sounds. No stridor. No wheezing, rhonchi or rales. Abdominal:      General: Abdomen is flat. Bowel sounds are normal. There is no distension. Palpations: Abdomen is soft. Tenderness: There is no abdominal tenderness. There is no right CVA tenderness, left CVA tenderness or guarding.    Genitourinary:     Penis: Normal.       Testes: Normal.      Comments: No tenderness on exam, + cremasterics  Musculoskeletal:         General: Normal range of motion. Cervical back: Normal range of motion and neck supple. No rigidity. Lymphadenopathy:      Cervical: Cervical adenopathy present. Skin:     General: Skin is warm and dry. Capillary Refill: Capillary refill takes less than 2 seconds. Coloration: Skin is not jaundiced or pale. Findings: No bruising or rash. Neurological:      General: No focal deficit present. Mental Status: He is alert and oriented to person, place, and time. Mental status is at baseline.    Psychiatric:         Mood and Affect: Mood normal.         Behavior: Behavior normal.       DIAGNOSTIC RESULTS   LABS:    Labs Reviewed   RESPIRATORY PANEL, MOLECULAR, WITH COVID-19 - Abnormal; Notable for the following components:       Result Value    Organism Influenza A H3 detected by PCR (*)     All other components within normal limits    Narrative:     ORDER#: [id number]                          ORDERED BY: Keon Mei  SOURCE: Nasopharyngeal                     COLLECTED:  12/25/22 10:22  [facility]IB[facility]ICS AT [place].:                      RECEIVED :  12/25/22 10:27   CBC WITH AUTO DIFFERENTIAL - Abnormal; Notable for the following components:    RDW 12.3 (*)     Lymphocytes Absolute 0.3 (*)     All other components within normal limits   COMPREHENSIVE METABOLIC PANEL W/ REFLEX TO MG FOR LOW K - Abnormal; Notable for the following components:    Sodium 132 (*)     Potassium reflex Magnesium 3.3 (*)     Chloride 97 (*)     Glucose 119 (*)     Creatinine 0.8 (*)     AST 42 (*)     All other components within normal limits   URINALYSIS WITH REFLEX TO CULTURE - Abnormal; Notable for the following components:    Color, UA DARK YELLOW (*)     Ketones, Urine TRACE (*)     Blood, Urine MODERATE (*)     Protein, UA 30 (*)     All other components within normal limits   MICROSCOPIC URINALYSIS - Abnormal; Notable for the following components:    RBC, UA 33 (*)     All other components within normal limits   COVID-19, RAPID   RAPID INFLUENZA A/B ANTIGENS   CULTURE, BLOOD 2    Narrative:     ORDER#: [id number]                          ORDERED BY: Jennifer Wolfe  SOURCE: Blood                              COLLECTED:  12/25/22 08:10  [facility] [place].:                      RECEIVED :  12/25/22 08:16  If child <=2 yrs old please draw pediatric bottle. ~Blood Culture #2   CULTURE, BLOOD 1    Narrative:     ORDER#: [id number]                          ORDERED BY: Jennifer Wolfe  SOURCE: Blood                              COLLECTED:  12/25/22 08:10  [facility] [place].:                      RECEIVED :  12/25/22 08:16  If child <=2 yrs old please draw pediatric bottle. ~Blood Culture 1   RESPIRATORY PANEL FILM ARRAY REPORT   LIPASE   LACTATE, SEPSIS   MAGNESIUM   ROTAVIRUS ANTIGEN, STOOL       When ordered, only abnormal lab results are displayed. All other labs were within normal range or not returned as of this dictation. EKG: When ordered, EKG's are interpreted by the Emergency Department Physician in the absence of a cardiologist.  Please see their note for interpretation of EKG. RADIOLOGY:   Non-plain film images such as CT, Ultrasound and MRI are read by the radiologist. Plain radiographic images are visualized and preliminarily interpreted by the  ED Provider with the below findings:        Interpretation pert Radiologist below, if available at the time of this note:    CT CHEST ABDOMEN PELVIS W CONTRAST Additional Contrast? Radiologist Recommendation   Final Result   Patchy right upper lobe into a lesser extent left lower lobe airspace disease   consistent with pneumonia. Mediastinal and right hilar lymphadenopathy is likely reactive in nature. A   lymphoproliferative disorder is thought to be less likely. No acute intra-abdominal or pelvic abnormality         XR CHEST (2 VW)   Final Result   Subtle right upper lobe infiltrate consistent with pneumonia. No results found.   No results found.    PROCEDURES   Unless otherwise noted below, none     Procedures    CRITICAL CARE TIME     I provided 10 minutes of non concurrent Critical Care time, excluding separately reportable procedures. There was a high probability of clinically significant/life threatening deterioration in the patient's condition which required my urgent intervention. This time spent assessing, reassessing patient, chart review and discussing case with other providers      CONSULTS:  IP CONSULT TO INFECTIOUS DISEASES      EMERGENCY DEPARTMENT COURSE and DIFFERENTIAL DIAGNOSIS/MDM:   Vitals:    Vitals:    12/25/22 1045 12/25/22 1130 12/25/22 1200 12/25/22 1215   BP: 119/73 105/72 110/70 107/66   Pulse: (!) 107 84 83 84   Resp: 24 24 24 22   Temp:   97.8 °F (36.6 °C)    TempSrc:   Oral    SpO2: 96% 97% 97% 96%   Weight:           Patient was given the following medications:  Medications   lactated ringers bolus (0 mLs IntraVENous Stopped 12/25/22 1140)   acetaminophen (TYLENOL) tablet 1,000 mg (1,000 mg Oral Given 12/25/22 0748)   cefTRIAXone (ROCEPHIN) 1,000 mg in dextrose 5 % 50 mL IVPB mini-bag (0 mg IntraVENous Stopped 12/25/22 1020)     And   azithromycin (ZITHROMAX) 500 mg in D5W 250ml addavial (0 mg IntraVENous Stopped 12/25/22 1130)   potassium bicarb-citric acid (EFFER-K) effervescent tablet 40 mEq (40 mEq Oral Given 12/25/22 0940)   iopamidol (ISOVUE-370) 76 % injection 75 mL (75 mLs IntraVENous Given 12/25/22 1115)     ED Course as of 12/28/22 0802   Sun Dec 25, 2022   0855 XR CHEST (2 VW)  Respiratory abx coverage ordered [KM]      ED Course User Index  [KM] Woodrow Cheung, APRN - CNP      Is this patient to be included in the SEP-1 Core Measure due to severe sepsis or septic shock?    No   Exclusion criteria - the patient is NOT to be included for SEP-1 Core Measure due to:  Viral etiology found or highly suspected (including COVID-19) without concomitant bacterial infection      27-year-old coming into ED with concern for URI as well as decreased urinary output over the past several days. Complicated by the fact that patient has recently treated Aspergillus pneumonia. Follows with ID as above. C. difficile, recurrence of aspiration pneumonia as well as influenza and COVID remain on the differential.    Basic labs were obtained. Imaging was also obtained. CBC did not reveal any leukocytosis or elevated ANC. CMP with mild hypokalemia which were repleted orally. Pseudohyponatremia. AST of 42 with no change to ALT, alk phos or total bilirubin. Urinalysis without infection. Dark concentrated urine. Microscopic urinalysis with 33 WBCs per hpf  Lipase and lactate are not elevated  Magnesium is within normal limits    Rapid COVID and flu testing were obtained. Both were negative. CT scan of chest abdomen and pelvis was obtained which showed a patchy right upper and questionable left lower lobe pneumonia. As such, patient is loaded with antibiotics. I then consulted his ID doctor, Dl Andrade. He recommended a respiratory PCR panel. The patient is positive for influenza A on this panel. The patient is already covered with Rocephin and azithromycin for concurrent bacterial pneumonia    The plan initially was to discharge patient home with antibiotics with close follow-up however, per recommendations from ID the patient will only be given Tamiflu. We did discuss with ID that the patient is outside the 3-day window however as the patient is symptomatic Dr. Yury Coyle does not believe this is of concern. The patient also be discharged home with Flomax and Zofran. He is encouraged to hydrate. He is already established with a urologist in [place] and is agreeable to follow closely with them regarding his hematuria. Return precautions discussed with patient. Also discussed with wife. They are agreeable with plan. Apparently, ID called wife and said ED would prescribe prophylaxis with Tamiflu for her.   Wife was offered prophylaxis if she would sign into ED. Patient's wife declines at this sita. The patient is at low risk for mortality based on demographic, history and clinical factors. Given the best available information and clinical assessment, I estimate the risk of hospitalization to be greater than risk of treatment at home. I have explained to the patient that the risk could rapidly change, given precautions for return and instructions. Explained to patient that the risk for mortality is low based on demographic, history and clinical factors. I discussed with patient the results of evaluation in the ED, diagnosis, care, and prognosis. The plan is to discharge to home. Patient is in agreement with plan and questions have been answered. I also discussed with patient the reasons which may require a return visit and the importance of follow-up care. The patient is well-appearing, nontoxic, and improved at the time of discharge. Patient agrees to call to arrange follow-up care as directed. Patient understands to return immediately for worsening/change in symptoms. I am the Primary Clinician of Record. FINAL IMPRESSION      1. Influenza A    2. Pneumonia of both lungs due to infectious organism, unspecified part of lung    3. Hematuria, unspecified type    4.  Hypokalemia          DISPOSITION/PLAN   DISPOSITION Decision To Discharge 12/25/2022 12:00:46 PM      PATIENT REFERREDTO:  Landry Elkins MD  [phone]    Call in 1 day  Follow up on your recent ED visit    Graciela Lewis MD  [address] [address]  [phone]    Call in 1 day  Follow up on your recent ED visit    Keren Champagne MD  [address] [facility] [address] [address]  [phone]    Call in 1 day  Follow up on your recent ED visit    DISCHARGE MEDICATIONS:  Discharge Medication List as of 12/25/2022 12:17 PM        START taking these medications    Details   tamsulosin (FLOMAX) 0.4 MG capsule Take 1 capsule by mouth daily, Disp-30 capsule, R-0Normal      ondansetron (ZOFRAN) 4 MG tablet Take 1 tablet by mouth every 8 hours as needed for Nausea, Disp-20 tablet, R-0Normal      cefUROXime (CEFTIN) 500 MG tablet Take 1 tablet by mouth 2 times daily for 7 days, Disp-14 tablet, R-0Normal      azithromycin (ZITHROMAX) 250 MG tablet Take 1 tablet by mouth See Admin Instructions for 5 days 500mg on day 1 followed by 250mg on days 2 - 5, Disp-6 tablet, R-0Normal             DISCONTINUED MEDICATIONS:  Discharge Medication List as of 12/25/2022 12:17 PM                 (Please note that portions ofthis note were completed with a voice recognition program.  Efforts were made to edit the dictations but occasionally words are mis-transcribed.)    MELI Vergara CNP (electronically signed)             MELI Vergara CNP  12/28/22 6016

## 2022-12-25 NOTE — ED PROVIDER NOTES
Attending Supervisory Note/Shared Visit   I have personally performed a face to face diagnostic evaluation on this patient. I have reviewed the mid-levels findings and agree. History and Exam by me shows an alert white male no acute distress. Recently treated for aspergillosis pneumonia. Finished antibiotics November. Been sick for the last week. Decreased appetite. 10 pound weight loss. Nausea vomiting and diarrhea since yesterday. General: Alert thin white male in no acute distress. HEENT: Conjunctiva are clear. Nose is clear. Oropharynx moist without erythema. Heart: Tachycardic, regular, no murmurs or gallops noted. Lungs: Breath sounds equal bilaterally and clear. Abdomen: Soft, nondistended, nontender. No mass organomegaly. Bowel sounds are normal.    Lab reviewed. COVID-negative. Flu negative. H&H of 13.6 and 40.7. White blood cell count 6600 with 87 neutrophils and 4 lymphs. H&H is normal.  White blood cell count 6600 with 87 neutrophils and 4 lymphs. Sodium 132 with a potassium of 3.3. BUN of 14 with a creatinine of 0.8. ALT of 38 with an AST of 42. Urinalysis shows moderate blood negative leukocytes. Lactic of 1.2. CT CHEST ABDOMEN PELVIS W CONTRAST Additional Contrast? Radiologist Recommendation   Final Result   Patchy right upper lobe into a lesser extent left lower lobe airspace disease   consistent with pneumonia. Mediastinal and right hilar lymphadenopathy is likely reactive in nature. A   lymphoproliferative disorder is thought to be less likely. No acute intra-abdominal or pelvic abnormality         XR CHEST (2 VW)   Final Result   Subtle right upper lobe infiltrate consistent with pneumonia. ID consulted. Dr. Agustin Padgett recommends admission. Per Dr. Agustin Padgett patient is to be discharged on Tamiflu. No other oral antibiotics at this time. He was discharged on Flomax. He will follow-up with his urologist in [place] for his hematuria.   Test results, diagnosis, and treatment plan were discussed the patient. He understands her treatment plan follow-up as discussed    1. Influenza A    2. Pneumonia of both lungs due to infectious organism, unspecified part of lung    3. Hematuria, unspecified type    4.  Hypokalemia      Disposition:  Discharge / Home      (Please note that portions of this note were completed with a voice recognition program.  Efforts were made to edit the dictations but occasionally words are mis-transcribed.)    Dennie Ar, MD  Attending Emergency Physician        Yoanna Cotto MD  12/25/22 5[facility] MD  12/25/22 1135

## 2022-12-26 ASSESSMENT — ENCOUNTER SYMPTOMS
CONSTIPATION: 0
DIARRHEA: 1
EYE PAIN: 0
SORE THROAT: 1
SHORTNESS OF BREATH: 0
ABDOMINAL PAIN: 0
BACK PAIN: 0
TROUBLE SWALLOWING: 0
NAUSEA: 0
VOMITING: 0
RHINORRHEA: 1
COUGH: 0
SINUS PRESSURE: 0

## 2022-12-29 LAB
BLOOD CULTURE, ROUTINE: NORMAL
CULTURE, BLOOD 2: NORMAL

## 2023-06-19 ENCOUNTER — HOSPITAL ENCOUNTER (OUTPATIENT)
Dept: CT IMAGING | Age: 33
Discharge: HOME | End: 2023-06-19
Payer: COMMERCIAL

## 2023-06-19 DIAGNOSIS — B44.9 ASPERGILLUS PNEUMONIA (HCC): ICD-10-CM

## 2023-06-19 PROCEDURE — 71250 CT THORAX DX C-: CPT

## 2023-11-02 ENCOUNTER — TELEPHONE (OUTPATIENT)
Dept: PULMONOLOGY | Age: 33
End: 2023-11-02

## 2023-11-02 NOTE — TELEPHONE ENCOUNTER
Pt called stating he took an at home covid test and it was positive. He wants to know if there is something Dr. Epifanio Duran recommends he take to help with the symptoms.

## 2024-01-05 ENCOUNTER — HOSPITAL ENCOUNTER (OUTPATIENT)
Dept: ULTRASOUND IMAGING | Age: 34
Discharge: HOME | End: 2024-01-05
Payer: COMMERCIAL

## 2024-01-05 DIAGNOSIS — N50.82 SCROTAL PAIN: ICD-10-CM

## 2024-01-05 PROCEDURE — 76770 US EXAM ABDO BACK WALL COMP: CPT

## 2024-02-12 ENCOUNTER — OFFICE VISIT (OUTPATIENT)
Dept: PULMONOLOGY | Age: 34
End: 2024-02-12
Payer: COMMERCIAL

## 2024-02-12 VITALS
OXYGEN SATURATION: 99 % | SYSTOLIC BLOOD PRESSURE: 120 MMHG | BODY MASS INDEX: 24.79 KG/M2 | HEIGHT: 74 IN | RESPIRATION RATE: 16 BRPM | DIASTOLIC BLOOD PRESSURE: 70 MMHG | HEART RATE: 72 BPM | TEMPERATURE: 97.7 F | WEIGHT: 193.2 LBS

## 2024-02-12 DIAGNOSIS — G47.33 OSA ON CPAP: Primary | ICD-10-CM

## 2024-02-12 DIAGNOSIS — B44.9 ASPERGILLUS PNEUMONIA (HCC): ICD-10-CM

## 2024-02-12 DIAGNOSIS — J45.20 MILD INTERMITTENT ASTHMA WITHOUT COMPLICATION: ICD-10-CM

## 2024-02-12 PROCEDURE — 99214 OFFICE O/P EST MOD 30 MIN: CPT | Performed by: INTERNAL MEDICINE

## 2024-02-12 NOTE — PROGRESS NOTES
Chief complaint  This is a 33 y.o. year old male  who comes to see me with a chief complaint of   Chief Complaint   Patient presents with    Sleep Apnea       HPI  Here with a cc of MARTÍNEZ, asthma, previous aspergillus    Has been using machine very well of late.  Although since Charles has noticed more fatigue akin when he had active aspergillus.  He also coughs up a pea sized chunk of sputum once or twice per week.  Does not feel sick per se but the fatigue and sputum has him somewhat concerned.  Albuterol seems to make things worse for him at times     Machine:  Patient is using a APAP machine.  Average usage is 6 hrs 37 min 00 sec.  He is meeting 4 or more hours per night 89% of the time.  Average AHI is 1.3                  2/12/2024     7:37 AM 6/12/2023     7:56 AM 12/5/2022     8:59 AM 3/9/2022     8:26 AM   Sleep Medicine   Sitting and reading 2 1 1 3   Watching TV 2 2 2 3   Sitting, inactive in a public place (e.g. a theatre or a meeting) 1 1 1 2   As a passenger in a car for an hour without a break 1 1 1 2   Lying down to rest in the afternoon when circumstances permit 1 2 1 3   Sitting and talking to someone 1 1 1 3   Sitting quietly after a lunch without alcohol 2 1 1 2   In a car, while stopped for a few minutes in traffic 0 1 0 1   [place] Sleepiness Score 10 10 8 19   Neck circumference (Inches)    15         Current Outpatient Medications:     albuterol sulfate  (90 Base) MCG/ACT inhaler, INHALE TWO PUFFS BY MOUTH EVERY 4 HOURS AS NEEDED FOR WHEEZING OR FOR SHORTNESS OF BREATH, Disp: 8.5 g, Rfl: 11    tamsulosin (FLOMAX) 0.4 MG capsule, Take 1 capsule by mouth daily (Patient not taking: Reported on 6/12/2023), Disp: 30 capsule, Rfl: 0    ondansetron (ZOFRAN) 4 MG tablet, Take 1 tablet by mouth every 8 hours as needed for Nausea (Patient not taking: Reported on 6/12/2023), Disp: 20 tablet, Rfl: 0        PHYSICAL EXAM:  GENERAL:  Well nourished, alert, appears stated age, no distress, flat

## 2024-02-12 NOTE — PATIENT INSTRUCTIONS
Collect sputum    Don't use albuterol if causing side effects    Sputum sample    Follow up in 6 months

## 2024-07-29 DIAGNOSIS — B44.9 ASPERGILLUS PNEUMONIA (HCC): ICD-10-CM

## 2024-07-31 LAB
BACTERIA SPEC RESP CULT: NORMAL
GRAM STN SPEC: NORMAL

## 2024-09-04 ENCOUNTER — OFFICE VISIT (OUTPATIENT)
Dept: PULMONOLOGY | Age: 34
End: 2024-09-04
Payer: COMMERCIAL

## 2024-09-04 VITALS
DIASTOLIC BLOOD PRESSURE: 60 MMHG | HEART RATE: 76 BPM | TEMPERATURE: 97.8 F | OXYGEN SATURATION: 99 % | HEIGHT: 74 IN | BODY MASS INDEX: 25.03 KG/M2 | WEIGHT: 195 LBS | RESPIRATION RATE: 16 BRPM | SYSTOLIC BLOOD PRESSURE: 112 MMHG

## 2024-09-04 DIAGNOSIS — J40 BRONCHITIS: ICD-10-CM

## 2024-09-04 DIAGNOSIS — J45.20 MILD INTERMITTENT ASTHMA WITHOUT COMPLICATION: ICD-10-CM

## 2024-09-04 DIAGNOSIS — G47.33 OSA ON CPAP: Primary | ICD-10-CM

## 2024-09-04 PROCEDURE — 99214 OFFICE O/P EST MOD 30 MIN: CPT | Performed by: INTERNAL MEDICINE

## 2024-09-04 ASSESSMENT — SLEEP AND FATIGUE QUESTIONNAIRES
HOW LIKELY ARE YOU TO NOD OFF OR FALL ASLEEP WHILE SITTING QUIETLY AFTER LUNCH WITHOUT ALCOHOL: SLIGHT CHANCE OF DOZING
HOW LIKELY ARE YOU TO NOD OFF OR FALL ASLEEP WHEN YOU ARE A PASSENGER IN A CAR FOR AN HOUR WITHOUT A BREAK: SLIGHT CHANCE OF DOZING
HOW LIKELY ARE YOU TO NOD OFF OR FALL ASLEEP IN A CAR, WHILE STOPPED FOR A FEW MINUTES IN TRAFFIC: WOULD NEVER DOZE
HOW LIKELY ARE YOU TO NOD OFF OR FALL ASLEEP WHILE SITTING AND READING: SLIGHT CHANCE OF DOZING
HOW LIKELY ARE YOU TO NOD OFF OR FALL ASLEEP WHILE LYING DOWN TO REST IN THE AFTERNOON WHEN CIRCUMSTANCES PERMIT: MODERATE CHANCE OF DOZING
HOW LIKELY ARE YOU TO NOD OFF OR FALL ASLEEP WHILE SITTING AND TALKING TO SOMEONE: SLIGHT CHANCE OF DOZING
HOW LIKELY ARE YOU TO NOD OFF OR FALL ASLEEP WHILE WATCHING TV: MODERATE CHANCE OF DOZING
HOW LIKELY ARE YOU TO NOD OFF OR FALL ASLEEP WHILE SITTING INACTIVE IN A PUBLIC PLACE: WOULD NEVER DOZE
ESS TOTAL SCORE: 8

## 2024-09-04 NOTE — PROGRESS NOTES
Chief complaint  This is a 33 y.o. year old male  who comes to see me with a chief complaint of   Chief Complaint   Patient presents with    Sleep Apnea       HPI  Here with a cc of MARTÍNEZ, asthma, previous aspergillus    He is doing ok.  About twice a week he will cough up foul smelling plug of mucus.  He did drop off culture last week and it was negative for growth.  Some more fatigue and occasional night sweats.  Breathing ok.  Using albuterol twice a week or so.  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  Average usage is 6 hrs 13 min 00 sec.  He is meeting 4 or more hours per night 81% of the time.  Average AHI is 1.2              9/4/2024     7:53 AM 2/12/2024     7:37 AM 6/12/2023     7:56 AM 12/5/2022     8:59 AM 3/9/2022     8:26 AM   Sleep Medicine   Sitting and reading 1 2 1 1 3   Watching TV 2 2 2 2 3   Sitting, inactive in a public place (e.g. a theatre or a meeting) 0 1 1 1 2   As a passenger in a car for an hour without a break 1 1 1 1 2   Lying down to rest in the afternoon when circumstances permit 2 1 2 1 3   Sitting and talking to someone 1 1 1 1 3   Sitting quietly after a lunch without alcohol 1 2 1 1 2   In a car, while stopped for a few minutes in traffic 0 0 1 0 1   [place] Sleepiness Score 8 10 10 8 19   Neck (Inches)     15         Current Outpatient Medications:     amoxicillin-clavulanate (AUGMENTIN) 875-125 MG per tablet, Take 1 tablet by mouth 2 times daily for 7 days, Disp: 14 tablet, Rfl: 0    albuterol sulfate  (90 Base) MCG/ACT inhaler, INHALE TWO PUFFS BY MOUTH EVERY 4 HOURS AS NEEDED FOR WHEEZING OR FOR SHORTNESS OF BREATH, Disp: 8.5 g, Rfl: 11    tamsulosin (FLOMAX) 0.4 MG capsule, Take 1 capsule by mouth daily (Patient not taking: Reported on 6/12/2023), Disp: 30 capsule, Rfl: 0    ondansetron (ZOFRAN) 4 MG tablet, Take 1 tablet by mouth

## 2025-01-09 ENCOUNTER — TELEPHONE (OUTPATIENT)
Dept: PULMONOLOGY | Age: 35
End: 2025-01-09

## 2025-01-09 DIAGNOSIS — J40 BRONCHITIS: Primary | ICD-10-CM

## 2025-01-09 NOTE — TELEPHONE ENCOUNTER
Patient called stating he woke up at 2 am with coughing and spitting up dark brown blood.  He said he has been having an increase in night sweats and fatigue.    He is wanting to be worked in this morning    Will fwd to dr patel for time or recommendations.

## 2025-01-10 DIAGNOSIS — J40 BRONCHITIS: ICD-10-CM

## 2025-01-12 LAB
BACTERIA SPEC RESP CULT: NORMAL
GRAM STN SPEC: NORMAL

## 2025-01-13 ENCOUNTER — TELEPHONE (OUTPATIENT)
Dept: INFECTIOUS DISEASES | Age: 35
End: 2025-01-13

## 2025-01-13 DIAGNOSIS — R04.2 HEMOPTYSIS: Primary | ICD-10-CM

## 2025-01-14 NOTE — TELEPHONE ENCOUNTER
I see  sent oral Augmentin on 1/10 and resp cx done normal constantino   If symptoms not improving after abx then will need Clinic visit  Did he start his abx ?    CT chest ordered for follow up

## 2025-01-14 NOTE — TELEPHONE ENCOUNTER
Pt has started abx. Pt given number to central scheduling to schedule CT chest. Pt verbalized understanding to contact this office if symptoms don't get better on oral Augmentin.

## 2025-01-15 ENCOUNTER — HOSPITAL ENCOUNTER (EMERGENCY)
Age: 35
Discharge: HOME | End: 2025-01-15
Attending: EMERGENCY MEDICINE
Payer: COMMERCIAL

## 2025-01-15 ENCOUNTER — APPOINTMENT (OUTPATIENT)
Dept: CT IMAGING | Age: 35
End: 2025-01-15
Payer: COMMERCIAL

## 2025-01-15 ENCOUNTER — TELEPHONE (OUTPATIENT)
Dept: PULMONOLOGY | Age: 35
End: 2025-01-15

## 2025-01-15 VITALS
BODY MASS INDEX: 24.93 KG/M2 | DIASTOLIC BLOOD PRESSURE: 83 MMHG | WEIGHT: 194.22 LBS | HEART RATE: 74 BPM | SYSTOLIC BLOOD PRESSURE: 134 MMHG | RESPIRATION RATE: 18 BRPM | OXYGEN SATURATION: 95 % | TEMPERATURE: 97.7 F | HEIGHT: 74 IN

## 2025-01-15 DIAGNOSIS — R04.2 HEMOPTYSIS: Primary | ICD-10-CM

## 2025-01-15 LAB
ALBUMIN SERPL-MCNC: 4.6 G/DL (ref 3.4–5)
ALBUMIN/GLOB SERPL: 1.3 {RATIO} (ref 1.1–2.2)
ALP SERPL-CCNC: 98 U/L (ref 40–129)
ALT SERPL-CCNC: 34 U/L (ref 10–40)
ANION GAP SERPL CALCULATED.3IONS-SCNC: 10 MMOL/L (ref 3–16)
AST SERPL-CCNC: 23 U/L (ref 15–37)
BASOPHILS # BLD: 0 K/UL (ref 0–0.2)
BASOPHILS NFR BLD: 0.8 %
BILIRUB SERPL-MCNC: 0.3 MG/DL (ref 0–1)
BUN SERPL-MCNC: 18 MG/DL (ref 7–20)
CALCIUM SERPL-MCNC: 9.6 MG/DL (ref 8.3–10.6)
CHLORIDE SERPL-SCNC: 104 MMOL/L (ref 99–110)
CO2 SERPL-SCNC: 26 MMOL/L (ref 21–32)
CREAT SERPL-MCNC: 0.8 MG/DL (ref 0.9–1.3)
DEPRECATED RDW RBC AUTO: 12.6 % (ref 12.4–15.4)
EKG ATRIAL RATE: 75 BPM
EKG DIAGNOSIS: NORMAL
EKG P AXIS: 18 DEGREES
EKG P-R INTERVAL: 172 MS
EKG Q-T INTERVAL: 370 MS
EKG QRS DURATION: 98 MS
EKG QTC CALCULATION (BAZETT): 413 MS
EKG R AXIS: 58 DEGREES
EKG T AXIS: 25 DEGREES
EKG VENTRICULAR RATE: 75 BPM
EOSINOPHIL # BLD: 0.2 K/UL (ref 0–0.6)
EOSINOPHIL NFR BLD: 2.8 %
GFR SERPLBLD CREATININE-BSD FMLA CKD-EPI: >90 ML/MIN/{1.73_M2}
GLUCOSE SERPL-MCNC: 80 MG/DL (ref 70–99)
HCT VFR BLD AUTO: 44.2 % (ref 40.5–52.5)
HGB BLD-MCNC: 15.6 G/DL (ref 13.5–17.5)
LYMPHOCYTES # BLD: 1.6 K/UL (ref 1–5.1)
LYMPHOCYTES NFR BLD: 28.7 %
MCH RBC QN AUTO: 30.3 PG (ref 26–34)
MCHC RBC AUTO-ENTMCNC: 35.3 G/DL (ref 31–36)
MCV RBC AUTO: 86 FL (ref 80–100)
MONOCYTES # BLD: 0.5 K/UL (ref 0–1.3)
MONOCYTES NFR BLD: 8.5 %
NEUTROPHILS # BLD: 3.3 K/UL (ref 1.7–7.7)
NEUTROPHILS NFR BLD: 59.2 %
PLATELET # BLD AUTO: 350 K/UL (ref 135–450)
PMV BLD AUTO: 8.2 FL (ref 5–10.5)
POTASSIUM SERPL-SCNC: 4.2 MMOL/L (ref 3.5–5.1)
PROT SERPL-MCNC: 8.1 G/DL (ref 6.4–8.2)
RBC # BLD AUTO: 5.14 M/UL (ref 4.2–5.9)
SODIUM SERPL-SCNC: 140 MMOL/L (ref 136–145)
TROPONIN, HIGH SENSITIVITY: <6 NG/L (ref 0–22)
WBC # BLD AUTO: 5.6 K/UL (ref 4–11)

## 2025-01-15 PROCEDURE — 93010 ELECTROCARDIOGRAM REPORT: CPT | Performed by: INTERNAL MEDICINE

## 2025-01-15 PROCEDURE — 99285 EMERGENCY DEPT VISIT HI MDM: CPT

## 2025-01-15 PROCEDURE — 36415 COLL VENOUS BLD VENIPUNCTURE: CPT

## 2025-01-15 PROCEDURE — 71260 CT THORAX DX C+: CPT

## 2025-01-15 PROCEDURE — 93005 ELECTROCARDIOGRAM TRACING: CPT | Performed by: EMERGENCY MEDICINE

## 2025-01-15 PROCEDURE — 85025 COMPLETE CBC W/AUTO DIFF WBC: CPT

## 2025-01-15 PROCEDURE — 6360000004 HC RX CONTRAST MEDICATION: Performed by: PHYSICIAN ASSISTANT

## 2025-01-15 PROCEDURE — 80053 COMPREHEN METABOLIC PANEL: CPT

## 2025-01-15 PROCEDURE — 84484 ASSAY OF TROPONIN QUANT: CPT

## 2025-01-15 RX ORDER — IOPAMIDOL 755 MG/ML
75 INJECTION, SOLUTION INTRAVASCULAR
Status: COMPLETED | OUTPATIENT
Start: 2025-01-15 | End: 2025-01-15

## 2025-01-15 RX ADMIN — IOPAMIDOL 75 ML: 755 INJECTION, SOLUTION INTRAVENOUS at 12:34

## 2025-01-15 ASSESSMENT — PAIN DESCRIPTION - PAIN TYPE: TYPE: ACUTE PAIN

## 2025-01-15 ASSESSMENT — ENCOUNTER SYMPTOMS
TROUBLE SWALLOWING: 0
DIARRHEA: 0
VOMITING: 0
BLOOD IN STOOL: 0
CHOKING: 0
COLOR CHANGE: 0
NAUSEA: 0
COUGH: 0
ABDOMINAL PAIN: 0
CHEST TIGHTNESS: 1
SHORTNESS OF BREATH: 0
WHEEZING: 0
SORE THROAT: 0

## 2025-01-15 ASSESSMENT — LIFESTYLE VARIABLES
HOW MANY STANDARD DRINKS CONTAINING ALCOHOL DO YOU HAVE ON A TYPICAL DAY: PATIENT DOES NOT DRINK
HOW OFTEN DO YOU HAVE A DRINK CONTAINING ALCOHOL: NEVER

## 2025-01-15 ASSESSMENT — PAIN DESCRIPTION - ORIENTATION: ORIENTATION: LEFT

## 2025-01-15 ASSESSMENT — PAIN DESCRIPTION - LOCATION: LOCATION: CHEST

## 2025-01-15 ASSESSMENT — PAIN SCALES - GENERAL: PAINLEVEL_OUTOF10: 5

## 2025-01-15 ASSESSMENT — PAIN - FUNCTIONAL ASSESSMENT: PAIN_FUNCTIONAL_ASSESSMENT: 0-10

## 2025-01-15 NOTE — ED PROVIDER NOTES
ED Attending Attestation Note    This patient was seen by the advanced practice provider.     I personally saw the patient. Management plan and care decisions have been discussed with me and I made/approved the management plan and take responsibility for patient management.     Briefly, 34 y.o. male with history of MARTÍNEZ, asthma, previous Aspergillus who presents with blood-tinged sputum.  States symptoms have been going on for the past week.  His pulmonologist had ordered Augmentin.  He has outpatient chest CT scheduled.    Focused exam:   Gen: 34 y.o. male, NAD, nontoxic appearing  HEENT: NCAT. MMM, PERRL. EOMI.   CV: RRR w/o MRG  Vascular: intact and symmetric radial and DP pulses bilaterally  Lungs: CTAB. No incr WOB.   Abdomen: Soft, nontender, nondistended. No rebound/guarding.   Neuro: awake and alert, speech clear w/o aphasia; intact and symmetric strength and sensation in all 4 extremities, CN 2-12 intact bilaterally    MDM:   This is a 34-year-old gentleman with history of asthma and aspergillosis presenting with blood-tinged sputum.  Vital stable, saturating appropriately on room air.  He is not in any respiratory distress and has unremarkable lungs on exam.  CTPA obtained with no evidence of PE, no evidence of cavitary lesion or abnormality.  Hemoglobin stable at 15.6.    Nursing notes, vital signs reviewed.     Records reviewed:   Yes, negative sputum culture from 5 days ago    I independently interpreted the following studies   CT CHEST PULMONARY EMBOLISM W CONTRAST   Final Result   No evidence of pulmonary embolism or acute pulmonary abnormality.             EKG interpretation by emergency physician: Normal sinus rhythm, rate 75, no ST or T wave features concerning for acute ischemia          For further details of the patient's emergency department visit, please see the advanced practice provider's documentation.    Elda Alegre MD     This report has been produced using speech recognition 
significantly decreases. He states he really isn't coughing much throughout the day, and wouldn't really say that he's had a cough - just that first thing in the morning when he is clearing his secretions he notices the blood. He has felt very fatigued as well as having some occasional night sweats as well. He reports in 2018 he was treated for a lung abscess by Dr Richardson with Pulm and Dr Jay with ID. He called Dr Richardson last week when these symptoms started and was initiated on antibiotics (per chart review - Augmentin). Pt reports that despite taking it as prescribed, he has noted no improvement. Patient denies any nausea, vomiting, fever, chills. He has no extremity swelling. He does not take any anticoagulants. He does not smoke.    - Vital signs were reviewed. Exam as above. Peripheral IV placed. Labs, Imaging ordered.   - Pertinent Labs & Imaging studies reviewed. (See chart for details)   -  Patient seen and evaluated in the emergency department.  -  Triage and nursing notes reviewed and incorporated.  -  Old chart records reviewed and incorporated.  -  Code status: FULL  -   I have seen and evaluated this patient with my supervising physician Elda Alegre MD.  -  Differential diagnosis includes: pneumonia, viral illness, bronchitis, malignancy, other  -  Work-up included:  See above  -  Results discussed with patient and/or family.  Labs show no leukocytosis or concerning anemia. Metabolic panel with no concerning abnormalities. Troponin is <6. Imaging studies show no evidence of pulmonary embolism or acute pulmonary abnormalities. There are stable appearing mediastinal and hilar lymph nodes.  Please see attending physician's note for EKG interpretation. At this time, we recommend discharge, as the patient has stable vitals, a reassuring workup, and is stable for outpatient management. He should follow up with pulmonlogy/ID as per their recommendations. The patient and/or family is agreeable with

## 2025-01-15 NOTE — TELEPHONE ENCOUNTER
Keegan is still spitting up blood every morning. He says 10 teaspoons dark red maybe brownish some days. The ED recommends he have a bronch. Please review and advise. Whatever time works for him.

## 2025-01-16 ENCOUNTER — PREP FOR PROCEDURE (OUTPATIENT)
Dept: PULMONOLOGY | Age: 35
End: 2025-01-16

## 2025-01-16 RX ORDER — SODIUM CHLORIDE 9 MG/ML
INJECTION, SOLUTION INTRAVENOUS PRN
Status: CANCELLED | OUTPATIENT
Start: 2025-01-16

## 2025-01-16 RX ORDER — SODIUM CHLORIDE 0.9 % (FLUSH) 0.9 %
5-40 SYRINGE (ML) INJECTION PRN
Status: CANCELLED | OUTPATIENT
Start: 2025-01-16

## 2025-01-16 RX ORDER — SODIUM CHLORIDE 0.9 % (FLUSH) 0.9 %
5-40 SYRINGE (ML) INJECTION EVERY 12 HOURS SCHEDULED
Status: CANCELLED | OUTPATIENT
Start: 2025-01-16

## 2025-01-21 ENCOUNTER — HOSPITAL ENCOUNTER (OUTPATIENT)
Age: 35
Setting detail: OUTPATIENT SURGERY
Discharge: HOME | End: 2025-01-21
Attending: INTERNAL MEDICINE | Admitting: INTERNAL MEDICINE
Payer: COMMERCIAL

## 2025-01-21 ENCOUNTER — HOSPITAL ENCOUNTER (OUTPATIENT)
Dept: ENDOSCOPY | Age: 35
Setting detail: OUTPATIENT SURGERY
Discharge: HOME | End: 2025-01-21
Attending: INTERNAL MEDICINE
Payer: COMMERCIAL

## 2025-01-21 VITALS
DIASTOLIC BLOOD PRESSURE: 67 MMHG | RESPIRATION RATE: 18 BRPM | HEIGHT: 74 IN | SYSTOLIC BLOOD PRESSURE: 112 MMHG | OXYGEN SATURATION: 99 % | TEMPERATURE: 96.8 F | BODY MASS INDEX: 25 KG/M2 | WEIGHT: 194.8 LBS | HEART RATE: 58 BPM

## 2025-01-21 DIAGNOSIS — R04.2 HEMOPTYSIS: ICD-10-CM

## 2025-01-21 LAB
APPEARANCE BRONCH: ABNORMAL
CLOT SPEC QL: ABNORMAL
COLOR BRONCH: COLORLESS
EOSINOPHIL NFR BRONCH MANUAL: 1 %
LYMPHOCYTES NFR BRONCH MANUAL: 7 % (ref 5–10)
MACROPHAGES NFR BRONCH MANUAL: 57 % (ref 90–95)
MONOCYTES NFR BRONCH MANUAL: 1 %
MONONUC CELLS NFR FLD MANUAL: 1 %
NEUTROPHILS NFR BRONCH MANUAL: 33 % (ref 5–10)
NUC CELL # BRONCH MANUAL: 61 /CUMM
PATH REV: YES
RBC # FLD MANUAL: 169 /CUMM
REPORT: NORMAL
RESP PATH DNA+RNA PNL L RESP NAA+NON-PRB: NORMAL
TOTAL CELLS COUNTED BRONCH: 100

## 2025-01-21 PROCEDURE — 87633 RESP VIRUS 12-25 TARGETS: CPT

## 2025-01-21 PROCEDURE — 87116 MYCOBACTERIA CULTURE: CPT

## 2025-01-21 PROCEDURE — 88112 CYTOPATH CELL ENHANCE TECH: CPT

## 2025-01-21 PROCEDURE — 2580000003 HC RX 258: Performed by: INTERNAL MEDICINE

## 2025-01-21 PROCEDURE — 2709999900 HC NON-CHARGEABLE SUPPLY: Performed by: INTERNAL MEDICINE

## 2025-01-21 PROCEDURE — 87206 SMEAR FLUORESCENT/ACID STAI: CPT

## 2025-01-21 PROCEDURE — 87102 FUNGUS ISOLATION CULTURE: CPT

## 2025-01-21 PROCEDURE — 3609010800 HC BRONCHOSCOPY ALVEOLAR LAVAGE: Performed by: INTERNAL MEDICINE

## 2025-01-21 PROCEDURE — 7100000000 HC PACU RECOVERY - FIRST 15 MIN: Performed by: INTERNAL MEDICINE

## 2025-01-21 PROCEDURE — 87070 CULTURE OTHR SPECIMN AEROBIC: CPT

## 2025-01-21 PROCEDURE — 7100000011 HC PHASE II RECOVERY - ADDTL 15 MIN: Performed by: INTERNAL MEDICINE

## 2025-01-21 PROCEDURE — 6360000002 HC RX W HCPCS: Performed by: INTERNAL MEDICINE

## 2025-01-21 PROCEDURE — 89051 BODY FLUID CELL COUNT: CPT

## 2025-01-21 PROCEDURE — 2720000010 HC SURG SUPPLY STERILE: Performed by: INTERNAL MEDICINE

## 2025-01-21 PROCEDURE — 31624 DX BRONCHOSCOPE/LAVAGE: CPT | Performed by: INTERNAL MEDICINE

## 2025-01-21 PROCEDURE — 88305 TISSUE EXAM BY PATHOLOGIST: CPT

## 2025-01-21 PROCEDURE — 99152 MOD SED SAME PHYS/QHP 5/>YRS: CPT | Performed by: INTERNAL MEDICINE

## 2025-01-21 PROCEDURE — 7100000001 HC PACU RECOVERY - ADDTL 15 MIN: Performed by: INTERNAL MEDICINE

## 2025-01-21 PROCEDURE — 7100000010 HC PHASE II RECOVERY - FIRST 15 MIN: Performed by: INTERNAL MEDICINE

## 2025-01-21 PROCEDURE — 87205 SMEAR GRAM STAIN: CPT

## 2025-01-21 PROCEDURE — 88312 SPECIAL STAINS GROUP 1: CPT

## 2025-01-21 RX ORDER — LIDOCAINE HYDROCHLORIDE 20 MG/ML
INJECTION, SOLUTION EPIDURAL; INFILTRATION; INTRACAUDAL; PERINEURAL PRN
Status: DISCONTINUED | OUTPATIENT
Start: 2025-01-21 | End: 2025-01-21 | Stop reason: ALTCHOICE

## 2025-01-21 RX ORDER — MIDAZOLAM HYDROCHLORIDE 1 MG/ML
INJECTION, SOLUTION INTRAMUSCULAR; INTRAVENOUS PRN
Status: DISCONTINUED | OUTPATIENT
Start: 2025-01-21 | End: 2025-01-21 | Stop reason: ALTCHOICE

## 2025-01-21 RX ORDER — SODIUM CHLORIDE 0.9 % (FLUSH) 0.9 %
5-40 SYRINGE (ML) INJECTION PRN
Status: DISCONTINUED | OUTPATIENT
Start: 2025-01-21 | End: 2025-01-21 | Stop reason: HOSPADM

## 2025-01-21 RX ORDER — SODIUM CHLORIDE 0.9 % (FLUSH) 0.9 %
5-40 SYRINGE (ML) INJECTION EVERY 12 HOURS SCHEDULED
Status: DISCONTINUED | OUTPATIENT
Start: 2025-01-21 | End: 2025-01-21 | Stop reason: HOSPADM

## 2025-01-21 RX ORDER — SODIUM CHLORIDE 9 MG/ML
INJECTION, SOLUTION INTRAVENOUS PRN
Status: DISCONTINUED | OUTPATIENT
Start: 2025-01-21 | End: 2025-01-21 | Stop reason: HOSPADM

## 2025-01-21 RX ORDER — FENTANYL CITRATE 50 UG/ML
INJECTION, SOLUTION INTRAMUSCULAR; INTRAVENOUS PRN
Status: DISCONTINUED | OUTPATIENT
Start: 2025-01-21 | End: 2025-01-21 | Stop reason: ALTCHOICE

## 2025-01-21 RX ADMIN — SODIUM CHLORIDE: 9 INJECTION, SOLUTION INTRAVENOUS at 11:04

## 2025-01-21 ASSESSMENT — PAIN - FUNCTIONAL ASSESSMENT
PAIN_FUNCTIONAL_ASSESSMENT: NONE - DENIES PAIN
PAIN_FUNCTIONAL_ASSESSMENT: NONE - DENIES PAIN
PAIN_FUNCTIONAL_ASSESSMENT: 0-10
PAIN_FUNCTIONAL_ASSESSMENT: NONE - DENIES PAIN
PAIN_FUNCTIONAL_ASSESSMENT: NONE - DENIES PAIN

## 2025-01-21 NOTE — PROCEDURES
PROCEDURE NOTE  Date: 1/21/2025   Name: Keegan Murphy  YOB: 1990    Procedures        BRONCHOSCOPY WITH BAL    Indications:  hemoptysis   Consent:  Signed on chart  Pre-op diagnosis:  Same    A Time Out was performed to identify the correct patient and the correct procedure.     Type of sedation used: Moderate Sedation  Sedation:  Fentanyl 50 mcg, Versed 3 mg  Anesthetic:  Lidocaine 5 mL    Physician/patient face-to-face sedation start time: 1159   Physician/patient face-to-face sedation stop time: 1208  Total moderate sedation time in minutes: 9 minutes    Patient was monitored continuously 1:1 throughout the entire procedure while sedation was administered    Mallampati Class:  III      ASA Class 2 - A patient with mild systemic disease      Narrative:   I entered the left nare.  I instilled 2 ml of 2% lidocaine on the the vocal cords, and then intubated the patient.  The vocal cords were normal.  I then anesthestized the entire bronchial tree with 5 mL of 2% lidocaine.    The right upper lobe demonstrated normal anatomy.      The middle lobe demonstrated normal anatomy.      The right lower lobe demonstrated normal anatomy.      The left upper lobe demonstrated normal anatomy.      The left lower lobe demonstrated normal anatomy.      I then wedged my scope in the RML to perform a BAL.  I instilled 60 mL of normal saline.  I suctioned 26 ml.  The fluid was mucid but clear.    I then removed my scope to check for bleeding.  There was no evidence of bleeding.    Estimated blood loss:  None  Specimens:  26 ml of lavage  Post-op diagnosis:  Pending  Complications: None    DO [facility].       I have presented reasonable alternatives to the patient's proposed care, treatment, and services. The discussion I have done encompassed risks, benefits, and side effects related to the alternatives and the risks related to not receiving the proposed care, treatment,

## 2025-01-21 NOTE — PROGRESS NOTES
Pt arrived to PACU from Endoscopy unit. Pt awake and alert, on room air. Pt denies c/o SOB or pain.   
Pt denies pain previous to procedure  VSS during procedure  BAL collected right middle lobe 60 cc saline in, 27 cc out collected for specimen  
Received from PACU. Admitted to Phase 2 care.  Awake and alert, respirations easy and even.  Oriented to room and surroundings.  No complaints.   
Tolerating being up on side of bed.  States he is ready to go home.   
Tolerating ice chips without difficulty.  Discharge instructions given to patient and wife.  Verbalize understanding.  No complaints.   
surgery.    C-Difficile admission screening and protocol:       * Admitted with diarrhea?                         [] YES    [x]  NO     *Prior history of C-Diff. In last 3 months? [] YES    [x]  NO     *Antibiotic use in the past 6-8 weeks?      []  NO    [x]  YES                 If yes, which ANTIBIOTIC AND REASON__per [facility]____     *Prior hospitalization or nursing home in the last month? []  YES    [x]  NO        SAFETY FIRST...call before you fall!!!

## 2025-01-21 NOTE — H&P
precautions per protocol      I have presented reasonable alternatives to the patient's proposed care, treatment, and services. The discussion I have done encompassed risks, benefits, and side effects related to the alternatives and the risks related to not receiving the proposed care, treatment, and services    We discussed complications including:  medication reaction, infection, bleeding

## 2025-01-22 LAB
ACID FAST STN SPEC QL: NORMAL
LOEFFLER MB STN SPEC: NORMAL
PATH CONSULT FLUID: NORMAL

## 2025-01-23 LAB
BACTERIA SPEC RESP CULT: NORMAL
GRAM STN SPEC: NORMAL

## 2025-02-03 LAB
FUNGUS SPEC CULT: NORMAL
LOEFFLER MB STN SPEC: NORMAL

## 2025-02-04 LAB
ACID FAST STN SPEC QL: NORMAL
MYCOBACTERIUM SPEC CULT: NORMAL

## 2025-02-10 LAB
FUNGUS SPEC CULT: NORMAL
LOEFFLER MB STN SPEC: NORMAL

## 2025-02-11 LAB
ACID FAST STN SPEC QL: NORMAL
MYCOBACTERIUM SPEC CULT: NORMAL

## 2025-02-17 LAB
FUNGUS SPEC CULT: NORMAL
LOEFFLER MB STN SPEC: NORMAL

## 2025-02-18 LAB
ACID FAST STN SPEC QL: NORMAL
MYCOBACTERIUM SPEC CULT: NORMAL

## 2025-02-24 LAB
FUNGUS SPEC CULT: NORMAL
LOEFFLER MB STN SPEC: NORMAL

## 2025-02-25 LAB
ACID FAST STN SPEC QL: NORMAL
MYCOBACTERIUM SPEC CULT: NORMAL

## 2025-03-04 ENCOUNTER — OFFICE VISIT (OUTPATIENT)
Dept: PULMONOLOGY | Age: 35
End: 2025-03-04
Payer: COMMERCIAL

## 2025-03-04 VITALS
RESPIRATION RATE: 18 BRPM | HEART RATE: 63 BPM | DIASTOLIC BLOOD PRESSURE: 60 MMHG | OXYGEN SATURATION: 98 % | WEIGHT: 196 LBS | SYSTOLIC BLOOD PRESSURE: 124 MMHG | HEIGHT: 74 IN | TEMPERATURE: 96.8 F | BODY MASS INDEX: 25.15 KG/M2

## 2025-03-04 DIAGNOSIS — B44.9 ASPERGILLUS PNEUMONIA (HCC): ICD-10-CM

## 2025-03-04 DIAGNOSIS — G47.33 OSA ON CPAP: Primary | ICD-10-CM

## 2025-03-04 DIAGNOSIS — J45.20 MILD INTERMITTENT ASTHMA WITHOUT COMPLICATION: ICD-10-CM

## 2025-03-04 LAB
ACID FAST STN SPEC QL: NORMAL
MYCOBACTERIUM SPEC CULT: NORMAL

## 2025-03-04 PROCEDURE — 99214 OFFICE O/P EST MOD 30 MIN: CPT | Performed by: INTERNAL MEDICINE

## 2025-03-04 RX ORDER — BUDESONIDE AND FORMOTEROL FUMARATE DIHYDRATE 160; 4.5 UG/1; UG/1
2 AEROSOL RESPIRATORY (INHALATION) 2 TIMES DAILY
Qty: 10.2 G | Refills: 3 | Status: SHIPPED | OUTPATIENT
Start: 2025-03-04

## 2025-03-04 NOTE — PATIENT INSTRUCTIONS
Start symbicort one puff twice a day for one month.  Then decide if it is working    Use albuterol if needed    Continue with CPAP    Follow up in 6 months

## 2025-03-04 NOTE — PROGRESS NOTES
organomegaly  EXTREMITIES:  No edema, no digital clubbing  NEURO:  No localizing deficits, CN II-XII intact    Pulmonary Function Testing 2/22  PFT does not demonstrates obstruction with FEV1 of 88 %  FVC of 78%  without bronchodilator response. Normal lung volume without air trapping or hyperinflation Diffusion capacity is normal  This is consistent with normal PFT.      CT Chest 1/25.  My impression is essentially clear     Bronchoscopy 1/25  AFB neg  Fungal neg  Pneumonia panel negative     I reviewed the above labs and images    Assessment/Plan:  1. MARTÍNEZ on CPAP  Benefiting and compliant.  Benefiting from therapy by a low Gilliam score, good energy levels, low fatigue, and control of chronic medical problems    2. Mild intermittent asthma without complication  Start symbicort one puff bid.  Give it a good month before deciding to stay on it or not.  Albuterol as needed    3. Aspergillus pneumonia (HCC)  Treated and resolved.  Last bronchoscopy was 1/25 and fungal cultures negative.  [place]  He is wearing a mask now and limiting his exposure to the fungi       Follow up in 6 months

## 2025-03-11 LAB
ACID FAST STN SPEC QL: NORMAL
MYCOBACTERIUM SPEC CULT: NORMAL

## (undated) DEVICE — SINGLE USE SUCTION VALVE MAJ-209: Brand: SINGLE USE SUCTION VALVE (STERILE)

## (undated) DEVICE — MAJ-1414 SINGLE USE ADPATER BIOPSY VALV: Brand: SINGLE USE ADAPTOR BIOPSY VALVE

## (undated) DEVICE — 60 ML SYRINGE REGULAR TIP: Brand: MONOJECT

## (undated) DEVICE — TRAP,MUCUS SPECIMEN, 80CC: Brand: MEDLINE

## (undated) DEVICE — SINGLE USE BIOPSY VALVE MAJ-210: Brand: SINGLE USE BIOPSY VALVE (STERILE)

## (undated) DEVICE — MASK, AEROSOL, ELONGATED, ADULT: Brand: MEDLINE

## (undated) DEVICE — SINGLE USE ASPIRATION NEEDLE NA-U401SX-4025N: Brand: SINGLE USE ASPIRATION NEEDLE

## (undated) DEVICE — NEBULIZER,KIT,T-MOUTHPIECE,6"RESER,7'TUB: Brand: MEDLINE

## (undated) DEVICE — SYRINGE MED 10ML POLYPR LUERSLIP TIP FLAT TOP W/O SFTY DISP